# Patient Record
Sex: FEMALE | Race: WHITE | NOT HISPANIC OR LATINO | URBAN - METROPOLITAN AREA
[De-identification: names, ages, dates, MRNs, and addresses within clinical notes are randomized per-mention and may not be internally consistent; named-entity substitution may affect disease eponyms.]

---

## 2018-07-18 ENCOUNTER — APPOINTMENT (RX ONLY)
Dept: URBAN - METROPOLITAN AREA CLINIC 23 | Facility: CLINIC | Age: 63
Setting detail: DERMATOLOGY
End: 2018-07-18

## 2018-07-18 DIAGNOSIS — L82.1 OTHER SEBORRHEIC KERATOSIS: ICD-10-CM

## 2018-07-18 DIAGNOSIS — L91.8 OTHER HYPERTROPHIC DISORDERS OF THE SKIN: ICD-10-CM

## 2018-07-18 DIAGNOSIS — D18.0 HEMANGIOMA: ICD-10-CM

## 2018-07-18 DIAGNOSIS — L81.4 OTHER MELANIN HYPERPIGMENTATION: ICD-10-CM

## 2018-07-18 PROBLEM — J30.1 ALLERGIC RHINITIS DUE TO POLLEN: Status: ACTIVE | Noted: 2018-07-18

## 2018-07-18 PROBLEM — M12.9 ARTHROPATHY, UNSPECIFIED: Status: ACTIVE | Noted: 2018-07-18

## 2018-07-18 PROBLEM — D18.01 HEMANGIOMA OF SKIN AND SUBCUTANEOUS TISSUE: Status: ACTIVE | Noted: 2018-07-18

## 2018-07-18 PROCEDURE — 99214 OFFICE O/P EST MOD 30 MIN: CPT | Mod: 25

## 2018-07-18 PROCEDURE — 11200 RMVL SKIN TAGS UP TO&INC 15: CPT

## 2018-07-18 PROCEDURE — ? COUNSELING

## 2018-07-18 PROCEDURE — ? SKIN TAG REMOVAL

## 2018-07-18 ASSESSMENT — LOCATION SIMPLE DESCRIPTION DERM
LOCATION SIMPLE: UPPER BACK
LOCATION SIMPLE: ABDOMEN
LOCATION SIMPLE: LEFT UPPER ARM
LOCATION SIMPLE: RIGHT THIGH
LOCATION SIMPLE: LEFT THIGH
LOCATION SIMPLE: LEFT LOWER BACK
LOCATION SIMPLE: RIGHT UPPER BACK

## 2018-07-18 ASSESSMENT — LOCATION DETAILED DESCRIPTION DERM
LOCATION DETAILED: SUPERIOR THORACIC SPINE
LOCATION DETAILED: RIGHT INFERIOR UPPER BACK
LOCATION DETAILED: RIGHT LATERAL ABDOMEN
LOCATION DETAILED: LEFT SUPERIOR MEDIAL MIDBACK
LOCATION DETAILED: LEFT ANTERIOR MEDIAL PROXIMAL UPPER ARM
LOCATION DETAILED: EPIGASTRIC SKIN
LOCATION DETAILED: LEFT ANTERIOR PROXIMAL THIGH
LOCATION DETAILED: RIGHT ANTERIOR PROXIMAL THIGH

## 2018-07-18 ASSESSMENT — LOCATION ZONE DERM
LOCATION ZONE: TRUNK
LOCATION ZONE: ARM
LOCATION ZONE: LEG

## 2018-07-18 NOTE — PROCEDURE: SKIN TAG REMOVAL
Include Z78.9 (Other Specified Conditions Influencing Health Status) As An Associated Diagnosis?: No
Consent: Written consent obtained and the risks of skin tag removal was reviewed with the patient including but not limited to bleeding, pigmentary change, infection, pain, and remote possibility of scarring.
Medical Necessity Information: It is in your best interest to select a reason for this procedure from the list below. All of these items fulfill various CMS LCD requirements except the new and changing color options.
Medical Necessity Clause: This procedure was medically necessary because the lesion that was treated was
Detail Level: Detailed
Add Associated Diagnoses If Applicable When Selecting Medical Necessity: Yes
Hemostasis: Aluminum Chloride

## 2018-11-20 ENCOUNTER — HOSPITAL ENCOUNTER (OUTPATIENT)
Dept: PHYSICAL THERAPY | Age: 63
Discharge: HOME | End: 2018-11-20
Payer: COMMERCIAL

## 2018-11-27 ENCOUNTER — HOSPITAL ENCOUNTER (OUTPATIENT)
Dept: PHYSICAL THERAPY | Age: 63
Discharge: HOME | End: 2018-11-27
Payer: COMMERCIAL

## 2018-11-27 PROCEDURE — 97014 ELECTRIC STIMULATION THERAPY: CPT | Performed by: PHYSICAL THERAPIST

## 2018-11-27 PROCEDURE — 97140 MANUAL THERAPY 1/> REGIONS: CPT | Performed by: PHYSICAL THERAPIST

## 2018-11-27 PROCEDURE — 97162 PT EVAL MOD COMPLEX 30 MIN: CPT | Performed by: PHYSICAL THERAPIST

## 2018-11-27 PROCEDURE — 97110 THERAPEUTIC EXERCISES: CPT | Performed by: PHYSICAL THERAPIST

## 2018-11-27 NOTE — THERAPY EVALUATION
Helga Zhu  : [id number] @ [address]  [address] [place] [address].  [phone]   [phone]        OUTPATIENT PHYSICAL THERAPY:Initial Assessment 2018        ICD-10: Treatment Diagnosis: Pain in thoracic spine (M54.6)  Benign paroxysmal vertigo, left ear (H81.12)  Precautions/Allergies:   Naprosyn [naproxen] and Sulfa (sulfonamide antibiotics)   Fall Risk Score:    Ambulatory/Rehab Services H2 Model Falls Risk Assessment    Risk Factors:       (1)  Dizziness/Vertigo       (1)  Visual Impairment [specify:  has bifocals but not wearing at time of exam] Ability to Rise from Chair:       (1)  Pushes up, successful in one attempt    Falls Prevention Plan:       No modifications necessary   Total: (5 or greater = High Risk): 3     [facility]. All Rights Reserved. [facility] [employer] Patent #[id number]. Federal Law prohibits the replication, distribution or use without written permission from [facility] [employer]     MD Orders: PT Evaluate and Treat chronic mid-line thoracic back pain   Vestibular therapy for BPPV MEDICAL/REFERRING DIAGNOSIS:  Pain in thoracic spine [M54.6]   DATE OF ONSET: chronic over the past 1-2 years for thoracic pain  Vertigo 2018  REFERRING PHYSICIAN: Angelo Nolasco MD  RETURN PHYSICIAN APPOINTMENT: 2019     INITIAL ASSESSMENT:  Ms. Jacquelynne Closs presents with chief c/o mid thoracic pain that increases as the day progresses with activity. Pt has poor posture and moderate scoliosis. Pt has difficulty with IADLs such as laundry, cooking, and other tasks such as washing dishes. Pt will benefit from postural/core strengthening to increase tolerance for lifting and activity.  Pt has also had brief intermittent episodes of vertigo and felt mild symptoms with L Jeffrey Postin; however, symptoms were brief and no nystagmus noted so unsure if truly BPPV and plan to further assess as pt is treated for her thoracic pain. Pt will benefit from vestibular rehab to decrease vertigo and decrease chance for falls. PROBLEM LIST (Impacting functional limitations):  1. Decreased ADL/Functional Activities  2. Decreased Ambulation Ability/Technique  3. Decreased Balance  4. Increased Pain  5. Decreased Activity Tolerance  6. Decreased Work Simplification/Energy Conservation Techniques  7. Decreased Flexibility/Joint Mobility  8. Decreased [place] with Home Exercise Program INTERVENTIONS PLANNED:  1. Balance Exercise  2. Cold  3. Electrical Stimulation  4. Heat  5. Home Exercise Program (HEP)  6. Manual Therapy  7. Range of Motion (ROM)  8. Therapeutic Activites  9. Therapeutic Exercise/Strengthening  10. Ultrasound (US)  11. aquatics    TREATMENT PLAN:  Effective Dates: 11/27/2018 TO 2/25/2019 (90 days). Frequency/Duration: 2-3 times a week for 90 Days    GOALS: (Goals have been discussed and agreed upon with patient.)  Short-Term Functional Goals: Time Frame: 4 weeks  1. Pt will be able to report compliance with HEP. 2. Pt will report at least 25% less symptoms with her back during standing kitchen activities such as washing dishes and cooking. 3. Pt will be able to tolerate beginning core strengthening for at least 45 minutes in aquatic environment without increased symptoms to increase activity tolerance. Discharge Goals: Time Frame: 8 weeks  1. Pt will be able to single leg stance >10 seconds noting improving balance. 2. Pt will be able to tolerate cooking/cleaning her kitchen for at least 1 hour with minimal (<2/10) to no c/o.   3. Pt will be able to report no episodes of vertigo with all ADLs for at least 2-3 weeks in a row. 4. Pt will decrease Oswestry score 10/50 or less noting improving functional status. Rehabilitation Potential For Stated Goals: Excellent  Regarding Helga Zhu's therapy, I certify that the treatment plan above will be carried out by a therapist or under their direction.   Thank you for this referral,  Janet Rosario, PT       Referring Physician Signature: Maureen Box MD              Date                      HISTORY:   History of Present Injury/Illness (Reason for Referral):  Pt reports over the past 1-2 years her mid-back has been progressively worsened. Pt reports awaking in the morning is minimal to no symptoms; however, with just light household tasks pain begins to increase. Standing at the counter working in the kitchen or loading dishes into the  causes pain to escalate. As the day progresses symptoms can increase up to 10/10 if she does not sit and rest. Sitting relieves some of the pain. Pt also reports having very brief episodes of dizziness (reports spinning sensation) since March 2018. She saw an ENT and she has some mild hearing loss. He diagnosed her with BPPV (and possibly vertiginous migraine but was unlikely due to the short spells. She reports about 4 episodes over the past month--Once, coming out of [employer], once walking out of [facility], one episode sitting in her den, one episode while driving. Symptoms last only a couple of seconds but she is fearful she could fall. She has not had any falls. Pt does reports in the past having migraine headaches but is taking topiramate and symptoms have mostly resolved. She has occasional tension headaches but denies headaches during vertigo episodes and denies cervical pain.    Past Medical History/Comorbidities:   Ms. Shirley Tolentino  has a past medical history of Backache, unspecified, Bleeding hemorrhoid, Breast cancer ([address] Degeneration of thoracic or thoracolumbar intervertebral disc, Disturbance of skin sensation, Gastrointestinal disorder, GERD (gastroesophageal reflux disease), Herpes simplex, Herpes simplex without mention of complication, Hyperlipidemia, Impaired fasting glucose, Intercostal neuritis, Malignant neoplasm of breast (female), unspecified site, Migraine, Migraine with aura, without mention of intractable migraine without mention of status migrainosus, Mixed hyperlipidemia, 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 Ms. Isac Liu  has a past surgical history that includes hx cholecystectomy (2004); hx hysterectomy; hx orthopaedic; hx tonsillectomy; pr breast surgery procedure unlisted (06/12/2014); and hx colonoscopy (08/22/2016). 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 , Disp: 30 g, Rfl: 12    DULoxetine (CYMBALTA) 60 mg capsule, Take 1 Cap by mouth daily. , Disp: 90 Cap, Rfl: 4    topiramate (TOPAMAX) 25 mg tablet, Take 1 Tab by mouth daily (with breakfast). , Disp: 90 Tab, Rfl: 4    pantoprazole (PROTONIX) 40 mg tablet, Take 1 Tab by mouth daily. , Disp: 90 Tab, Rfl: 4    SUMAtriptan (IMITREX) 100 mg tablet, Take 1 Tab by mouth once as needed for Migraine. , Disp: 9 Tab, Rfl: 12    atorvastatin (LIPITOR) 10 mg tablet, Take 1 Tab by mouth daily. , Disp: 90 Tab, Rfl: 4    tiZANidine (ZANAFLEX) 4 mg tablet, Take 1 Tab by mouth two (2) times daily as needed. , Disp: 60 Tab, Rfl: 6    magnesium 250 mg tab, Take 1 Tab by mouth daily. , Disp: , Rfl:     garlic 2,630 mg cap, Take 1 Cap by mouth daily. , Disp: , Rfl:     Biotin 5 mg tab, Take 1 Tab by mouth daily. , Disp: , Rfl:     multivitamin (ONE A DAY) tablet, Take 1 Tab by mouth daily. , Disp: , Rfl:     GLUCOSAMINE HCL/CHONDR SALAS A NA (GLUCOSAMINE-CHONDROITIN) 750-600 mg tab, Take 2 Tabs by mouth daily. , Disp: , Rfl:     cholecalciferol, vitamin D3, (VITAMIN D3) 2,000 unit tab, Take 2 Tabs by mouth daily. , Disp: , Rfl:     cetirizine (ZYRTEC) 10 mg tablet, Take 10 mg by mouth daily. , Disp: , Rfl:    Date Last Reviewed:  11/27/2018   # of Personal Factors/Comorbidities that affect the Plan of Care: 3+: HIGH COMPLEXITY   EXAMINATION:   Observation/Orthostatic Postural Assessment:          Moderate scoliotic S curve through lower thoracic area; mod rounded shoulders with FHP with increased cervicothoracic hump  Palpation:          Tender R>L mid thoracic paraspinals with tightness and trigger points noted R thoracic paraspinals  ROM:  UEs and LEs WFL  Cervical AROM WFL  Trunk Flexion to ankles but pain in thoracic area upon return to standing  Trunk extension WFL  Sidebending WFL  Rotation to R 25% limited and painful   to L mild \"twinge\" but ROM WFL                  Strength:  UEs grossly 4+/5 except B wrist extension 4/5; BLE hip flexion 4+/5; knees 4+/5; ankle DF 4+/5  Special Tests: Smooth Pursuits WNL; Saccades WNL; Head Thrust (-); Head Shaking (-); PG&E [place] (L had brief dizziness lasting less than 5 seconds and no nystagmus noted); [facility] R (-)  Neurological Screen:        Sensation: denies numbness/tingling and intact   Functional Mobility:         Gait/Ambulation:  Independent; reports if has an episode of vertigo she if fearful of falling but not today        Transfers:  Independent with min use of UEs        Bed Mobility:  Independent but mild increased time to get into prone  Balance:          Single leg stance <3 seconds bilaterally   Body Structures Involved:  1. Eyes and Ears  2. Joints  3. Muscles Body Functions Affected:  1. Sensory/Pain  2. Neuromusculoskeletal  3. Movement Related  4. Vestibular Activities and Participation Affected:  1.  General Tasks and Demands  2. Mobility  3. Domestic Life  4. Community, Social and [place]   # of elements that affect the Plan of Care: 4+: HIGH COMPLEXITY   CLINICAL PRESENTATION:   Presentation: Evolving clinical presentation with unstable and unpredictable characteristics: HIGH COMPLEXITY   CLINICAL DECISION MAKING:   Outcome Measure: Tool Used: Modified Oswestry Low Back Pain Questionnaire  Score:  Initial: 17/50  Most Recent: X/50 (Date: -- )   Interpretation of Score: Each section is scored on a 0-5 scale, 5 representing the greatest disability. The scores of each section are added together for a total score of 50. Medical Necessity:   · Patient is expected to demonstrate progress in strength, range of motion, balance and functional technique to improve safety during gait and improve tolerance for activity/IADLs. Reason for Services/Other Comments:  · Patient continues to require modification of therapeutic interventions to increase complexity of exercises. Use of outcome tool(s) and clinical judgement create a POC that gives a: Questionable prediction of patient's progress: MODERATE COMPLEXITY   TREATMENT:   (In addition to Assessment/Re-Assessment sessions the following treatments were rendered)    Therapeutic Exercise: (see flow sheet below for minutes):  Exercises per grid below to improve mobility and strength. Required minimal visual, verbal and tactile cues to promote proper body alignment and promote proper body posture. Aquatic Therapy (see flow sheet below for minutes): Aquatic treatment performed per flow grid for Decreased muscle strength, Decreased static/dynamic balance and reactive control, Decreased range of motion, Decreased activity endurance and Decompression. Cues provided for technique.       Date: 11/27/18       Modalities: 15 minutes       Pre-mod B mid thoracic paraspinals  One channel R/one channel L; pt in prone with pillow under abdomen with ice over thoracic region Manual Therapy: 20 minutes       Soft tissue mobilization (STM) thoracic paraspinals; mid trap/rhomoboids/lower trap Pt in prone with pillow under abdomen       Epley Maneuver  To th L               Aquatic Exercise: next       Gait F/B/S/M        Heel/Toe walk        4-way        PF/DF        Hamstring Curls        Hip Flexion with knee ext. (rockette)        Squats          SLR march        Lunges        Hip circles        Hip horizontal abduction/adduction        Core:          Core:        Deep well bike        Deep well jumping geri        Deep well scissors        Deep well:  traction                Hamstring Stretch        Step Lunge        Piriformis stretch        PF Stretch        Balance: Single leg Stance        Balance: Tandem                          Therapeutic Exercise: 15 minutes       Mid thoracic stretch 3 H 15       Doorway pec stretch 3 H 15       scapt-retract x10       Transverse abdominous stabilization Pt in supine   10 H 5-10       bridging x10               F=forward  B=Backward  S=sidesteps  M=marches    H=hold    Manual: (see flow sheet above for minutes) see above to decrease muscular tightness and release trigger points  Modalities: (see flow sheet above for minutes) see above to decrease pain and muscular spasm; pt monitored and skin WNL    HEP: Pt was instructed with above HEP during initial evaluation and issued handout. Treatment/Session Assessment:  Pt only felt brief dizziness with [facility]; however, symptoms were not convincing that it was BPPV. Plan to incorporate vestibular treatment for hypofunctioning vestibular symptom as well. Pt had a lessening of symptoms with STM to mid thoracic paraspinals and felt relief with e-stim and ice; however, after return to sitting, pt still felt symptoms in mid-thoracic area. Discussed using aquatics for the warmth of the water to begin to work on core strengthening and pt agreeable.      · Pain/ Symptoms: Initial: 3-10/10 mid thoracic constant ache that worsens with activity    Brief episodes of vertigo lasting a few seconds (2 episodes last week and about 4-5/month; however no c/o today) Post Session:  2/10 mid thoracic  ·   Compliance with Program/Exercises: Will assess as treatment progresses. · Recommendations/Intent for next treatment session: \"Next visit will focus on advancements to more challenging activities\". Plan to reassess Jennifer Christine next session and then proceed with aquatics for postural strengthening. Incorporate head movements into gait activities.    Total Treatment Duration:  PT Patient Time In/Time Out  Time In: 1100  Time Out: 1210     Janet Rosario, PT

## 2018-11-29 ENCOUNTER — HOSPITAL ENCOUNTER (OUTPATIENT)
Dept: PHYSICAL THERAPY | Age: 63
Discharge: HOME | End: 2018-11-29
Payer: COMMERCIAL

## 2018-11-29 PROCEDURE — 97113 AQUATIC THERAPY/EXERCISES: CPT

## 2018-11-29 PROCEDURE — 97014 ELECTRIC STIMULATION THERAPY: CPT

## 2018-11-29 NOTE — PROGRESS NOTES
Helga Zhu  : [id number] @ [address], [address] [place] [address]  [phone]   [phone]        OUTPATIENT PHYSICAL THERAPY:Daily Note 2018        ICD-10: Treatment Diagnosis: Pain in thoracic spine (M54.6)  Benign paroxysmal vertigo, left ear (H81.12)  Precautions/Allergies:   Naprosyn [naproxen] and Sulfa (sulfonamide antibiotics)   Fall Risk Score:    Ambulatory/Rehab Services H2 Model Falls Risk Assessment    Risk Factors:       (1)  Dizziness/Vertigo       (1)  Visual Impairment [specify:  has bifocals but not wearing at time of exam] Ability to Rise from Chair:       (1)  Pushes up, successful in one attempt    Falls Prevention Plan:       No modifications necessary   Total: (5 or greater = High Risk): 3     [facility] [place]. All Rights Reserved. [facility] [employer] Patent #[id number]. Federal Law prohibits the replication, distribution or use without written permission from [employer]     MD Orders: PT Evaluate and Treat chronic mid-line thoracic back pain   Vestibular therapy for BPPV MEDICAL/REFERRING DIAGNOSIS:  Pain in thoracic spine [M54.6]   DATE OF ONSET: chronic over the past 1-2 years for thoracic pain  Vertigo 2018  REFERRING PHYSICIAN: Billy Helton MD  RETURN PHYSICIAN APPOINTMENT: 2019     INITIAL ASSESSMENT:  Ms. Palmer Case presents with chief c/o mid thoracic pain that increases as the day progresses with activity. Pt has poor posture and moderate scoliosis. Pt has difficulty with IADLs such as laundry, cooking, and other tasks such as washing dishes. Pt will benefit from postural/core strengthening to increase tolerance for lifting and activity.  Pt has also had brief intermittent episodes of vertigo and felt mild symptoms with MARIANO Wood; however, symptoms were brief and no nystagmus noted so unsure if truly BPPV and plan to further assess as pt is treated for her thoracic pain. Pt will benefit from vestibular rehab to decrease vertigo and decrease chance for falls. PROBLEM LIST (Impacting functional limitations):  1. Decreased ADL/Functional Activities  2. Decreased Ambulation Ability/Technique  3. Decreased Balance  4. Increased Pain  5. Decreased Activity Tolerance  6. Decreased Work Simplification/Energy Conservation Techniques  7. Decreased Flexibility/Joint Mobility  8. Decreased [place] with Home Exercise Program INTERVENTIONS PLANNED:  1. Balance Exercise  2. Cold  3. Electrical Stimulation  4. Heat  5. Home Exercise Program (HEP)  6. Manual Therapy  7. Range of Motion (ROM)  8. Therapeutic Activites  9. Therapeutic Exercise/Strengthening  10. Ultrasound (US)  11. aquatics    TREATMENT PLAN:  Effective Dates: 11/27/2018 TO 2/25/2019 (90 days). Frequency/Duration: 2-3 times a week for 90 Days    GOALS: (Goals have been discussed and agreed upon with patient.)  Short-Term Functional Goals: Time Frame: 4 weeks  1. Pt will be able to report compliance with HEP. 2. Pt will report at least 25% less symptoms with her back during standing kitchen activities such as washing dishes and cooking. 3. Pt will be able to tolerate beginning core strengthening for at least 45 minutes in aquatic environment without increased symptoms to increase activity tolerance. Discharge Goals: Time Frame: 8 weeks  1. Pt will be able to single leg stance >10 seconds noting improving balance. 2. Pt will be able to tolerate cooking/cleaning her kitchen for at least 1 hour with minimal (<2/10) to no c/o.   3. Pt will be able to report no episodes of vertigo with all ADLs for at least 2-3 weeks in a row. 4. Pt will decrease Oswestry score 10/50 or less noting improving functional status. Rehabilitation Potential For Stated Goals: Excellent  Regarding Helga Zhu's therapy, I certify that the treatment plan above will be carried out by a therapist or under their direction.   Thank you for this referral,  Armida Zaidi, [facility]                   HISTORY:   History of Present Injury/Illness (Reason for Referral):  Pt reports over the past 1-2 years her mid-back has been progressively worsened. Pt reports awaking in the morning is minimal to no symptoms; however, with just light household tasks pain begins to increase. Standing at the counter working in the kitchen or loading dishes into the  causes pain to escalate. As the day progresses symptoms can increase up to 10/10 if she does not sit and rest. Sitting relieves some of the pain. Pt also reports having very brief episodes of dizziness (reports spinning sensation) since March 2018. She saw an ENT and she has some mild hearing loss. He diagnosed her with BPPV (and possibly vertiginous migraine but was unlikely due to the short spells. She reports about 4 episodes over the past month--Once, coming out of [employer], once walking out of [facility], one episode sitting in her den, one episode while driving. Symptoms last only a couple of seconds but she is fearful she could fall. She has not had any falls. Pt does reports in the past having migraine headaches but is taking topiramate and symptoms have mostly resolved. She has occasional tension headaches but denies headaches during vertigo episodes and denies cervical pain.    Past Medical History/Comorbidities:   Ms. 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 Ms. Ludwin Montalvo  has a past surgical history that includes hx cholecystectomy (2004); hx hysterectomy; hx orthopaedic; hx tonsillectomy; pr breast surgery procedure unlisted (06/12/2014); and hx colonoscopy (08/22/2016). 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 , Disp: 30 g, Rfl: 12    DULoxetine (CYMBALTA) 60 mg capsule, Take 1 Cap by mouth daily. , Disp: 90 Cap, Rfl: 4    topiramate (TOPAMAX) 25 mg tablet, Take 1 Tab by mouth daily (with breakfast). , Disp: 90 Tab, Rfl: 4    pantoprazole (PROTONIX) 40 mg tablet, Take 1 Tab by mouth daily. , Disp: 90 Tab, Rfl: 4    SUMAtriptan (IMITREX) 100 mg tablet, Take 1 Tab by mouth once as needed for Migraine. , Disp: 9 Tab, Rfl: 12    atorvastatin (LIPITOR) 10 mg tablet, Take 1 Tab by mouth daily. , Disp: 90 Tab, Rfl: 4    tiZANidine (ZANAFLEX) 4 mg tablet, Take 1 Tab by mouth two (2) times daily as needed. , Disp: 60 Tab, Rfl: 6    magnesium 250 mg tab, Take 1 Tab by mouth daily. , Disp: , Rfl:     garlic 1,122 mg cap, Take 1 Cap by mouth daily. , Disp: , Rfl:     Biotin 5 mg tab, Take 1 Tab by mouth daily. , Disp: , Rfl:     multivitamin (ONE A DAY) tablet, Take 1 Tab by mouth daily. , Disp: , Rfl:     GLUCOSAMINE HCL/CHONDR MARITZA LYMAN (GLUCOSAMINE-CHONDROITIN) 750-600 mg tab, Take 2 Tabs by mouth daily. , Disp: , Rfl:     cholecalciferol, vitamin D3, (VITAMIN D3) 2,000 unit tab, Take 2 Tabs by mouth daily. , Disp: , Rfl:     cetirizine (ZYRTEC) 10 mg tablet, Take 10 mg by mouth daily. , Disp: , Rfl:    Date Last Reviewed:  11/29/2018   EXAMINATION:   Observation/Orthostatic Postural Assessment:          Moderate scoliotic S curve through lower thoracic area; mod rounded shoulders with FHP with increased cervicothoracic hump  Palpation:          Tender R>L mid thoracic paraspinals with tightness and trigger points noted R thoracic paraspinals  ROM:  UEs and LEs WFL  Cervical AROM WFL  Trunk Flexion to ankles but pain in thoracic area upon return to standing  Trunk extension WFL  Sidebending WFL  Rotation to R 25% limited and painful   to L mild \"twinge\" but ROM WFL                  Strength:  UEs grossly 4+/5 except B wrist extension 4/5; BLE hip flexion 4+/5; knees 4+/5; ankle DF 4+/5  Special Tests: Smooth Pursuits WNL; Saccades WNL; Head Thrust (-); Head Shaking (-); [employer] (L had brief dizziness lasting less than 5 seconds and no nystagmus noted); [facility] R (-)  Neurological Screen:        Sensation: denies numbness/tingling and intact   Functional Mobility:         Gait/Ambulation:  Independent; reports if has an episode of vertigo she if fearful of falling but not today        Transfers:  Independent with min use of UEs        Bed Mobility:  Independent but mild increased time to get into prone  Balance:          Single leg stance <3 seconds bilaterally   Body Structures Involved:  1. Eyes and Ears  2. Joints  3. Muscles Body Functions Affected:  1. Sensory/Pain  2. Neuromusculoskeletal  3. Movement Related  4. Vestibular Activities and Participation Affected:  1. General Tasks and Demands  2. Mobility  3. Domestic Life  4.  Community, Social and Civic Life   CLINICAL PRESENTATION:   CLINICAL DECISION MAKING:   Outcome Measure: Tool Used: Modified Oswestry Low Back Pain Questionnaire  Score:  Initial: 17/50  Most Recent: X/50 (Date: -- )   Interpretation of Score: Each section is scored on a 0-5 scale, 5 representing the greatest disability. The scores of each section are added together for a total score of 50. Medical Necessity:   · Patient is expected to demonstrate progress in strength, range of motion, balance and functional technique to improve safety during gait and improve tolerance for activity/IADLs. Reason for Services/Other Comments:  · Patient continues to require modification of therapeutic interventions to increase complexity of exercises. TREATMENT:   (In addition to Assessment/Re-Assessment sessions the following treatments were rendered)    Therapeutic Exercise: (see flow sheet below for minutes):  Exercises per grid below to improve mobility and strength. Required minimal visual, verbal and tactile cues to promote proper body alignment and promote proper body posture. Aquatic Therapy (see flow sheet below for minutes): Aquatic treatment performed per flow grid for Decreased muscle strength, Decreased static/dynamic balance and reactive control, Decreased range of motion, Decreased activity endurance and Decompression. Cues provided for technique. Date: 11/27/18 11/29/18  Visit 2      Modalities: 15 minutes 15 min      Pre-mod B mid thoracic paraspinals  One channel R/one channel L; pt in prone with pillow under abdomen with ice over thoracic region IFC with Ice to Thoracic in prone with pillow under abd. Manual Therapy: 20 minutes       Soft tissue mobilization (STM) thoracic paraspinals; mid trap/rhomoboids/lower trap Pt in prone with pillow under abdomen       Epley Maneuver  To th L               Aquatic Exercise: next 60 min  1.5#      Gait F/B/S/M  x4L      Heel/Toe walk        4-way        PF/DF        Hamstring Curls        Hip Flexion with knee ext.   (rockeileen) Squats          SLR march        Lunges        Hip circles        Hip horizontal abduction/adduction        Vestibular Re-Ed: eyes fixed  x4L      Vestibular re-ed: eyes following shoulder movement  x4L      Squats with UE movmemnt  x25      Saddle seating with 2 noodles: UE movement  x4L      Shoulder glides keeping head and hips still  x20      Pelvic tilts on noodle  x20      Core: tray walking push/pull with eyes fixed on target    x4L      Core:        Deep well bike  5 min      Deep well jumping geri  2 min      Deep well scissors  2 min      Deep well:  traction  6 min              Hamstring Stretch        Step Lunge        Piriformis stretch        PF Stretch        Balance: Single leg Stance        Balance: Tandem                          Therapeutic Exercise: 15 minutes       Mid thoracic stretch 3 H 15       Doorway pec stretch 3 H 15       scapt-retract x10       Transverse abdominous stabilization Pt in supine   10 H 5-10       bridging x10               F=forward  B=Backward  S=sidesteps  M=marches    H=hold    Manual: (see flow sheet above for minutes) see above to decrease muscular tightness and release trigger points  Modalities: (see flow sheet above for minutes) see above to decrease pain and muscular spasm; pt monitored and skin WNL    HEP: Pt was instructed with above HEP during initial evaluation and issued handout. Treatment/Session Assessment:  Pt performed aquatic therapy with 1.5# on BLE today to decrease pain and increase ROM. Pt did well in aquatic setting with noted decrease in pain. Performed IFC with ice therapy post aquatic session to reduce pain further. Pt reports being sore after last session and feeling better post session. · Pain/ Symptoms: Initial:   2-3/10 mid thoracic constant ache that worsens with activity    I'm sore after the last session like Janet told me I would be Post Session:  2/10 mid thoracic  ·   Compliance with Program/Exercises:  Will assess as treatment progresses. Recommendations/Intent for next treatment session: \"Next visit will focus on advancements to more challenging activities\".      Total Treatment Duration:  PT Patient Time In/Time Out  Time In: 1100  Time Out: [address], PTA

## 2018-12-04 ENCOUNTER — HOSPITAL ENCOUNTER (OUTPATIENT)
Dept: PHYSICAL THERAPY | Age: 63
Discharge: HOME | End: 2018-12-04
Payer: COMMERCIAL

## 2018-12-04 PROCEDURE — 97140 MANUAL THERAPY 1/> REGIONS: CPT | Performed by: PHYSICAL THERAPIST

## 2018-12-04 PROCEDURE — 97113 AQUATIC THERAPY/EXERCISES: CPT | Performed by: PHYSICAL THERAPIST

## 2018-12-04 NOTE — PROGRESS NOTES
Helga Zhu  : [id number] @ [address], [address] [place] [address]  [phone]   [phone]        OUTPATIENT PHYSICAL THERAPY:Daily Note 2018        ICD-10: Treatment Diagnosis: Pain in thoracic spine (M54.6)  Benign paroxysmal vertigo, left ear (H81.12)  Precautions/Allergies:   Naprosyn [naproxen] and Sulfa (sulfonamide antibiotics)   Fall Risk Score:    Ambulatory/Rehab Services H2 Model Falls Risk Assessment    Risk Factors:       (1)  Dizziness/Vertigo       (1)  Visual Impairment [specify:  has bifocals but not wearing at time of exam] Ability to Rise from Chair:       (1)  Pushes up, successful in one attempt    Falls Prevention Plan:       No modifications necessary   Total: (5 or greater = High Risk): 3     [facility] of [employer]. All Rights Reserved. [facility] [employer] Patent #[id number]. Federal Law prohibits the replication, distribution or use without written permission from [employer]     MD Orders: PT Evaluate and Treat chronic mid-line thoracic back pain   Vestibular therapy for BPPV MEDICAL/REFERRING DIAGNOSIS:  Pain in thoracic spine [M54.6]   DATE OF ONSET: chronic over the past 1-2 years for thoracic pain  Vertigo 2018  REFERRING PHYSICIAN: Charity Hernández MD  RETURN PHYSICIAN APPOINTMENT: 2019     INITIAL ASSESSMENT:  Ms. Shantel Mcintyre presents with chief c/o mid thoracic pain that increases as the day progresses with activity. Pt has poor posture and moderate scoliosis. Pt has difficulty with IADLs such as laundry, cooking, and other tasks such as washing dishes. Pt will benefit from postural/core strengthening to increase tolerance for lifting and activity.  Pt has also had brief intermittent episodes of vertigo and felt mild symptoms with MARIANO Dubose; however, symptoms were brief and no nystagmus noted so unsure if truly BPPV and plan to further assess as pt is treated for her thoracic pain. Pt will benefit from vestibular rehab to decrease vertigo and decrease chance for falls. PROBLEM LIST (Impacting functional limitations):  1. Decreased ADL/Functional Activities  2. Decreased Ambulation Ability/Technique  3. Decreased Balance  4. Increased Pain  5. Decreased Activity Tolerance  6. Decreased Work Simplification/Energy Conservation Techniques  7. Decreased Flexibility/Joint Mobility  8. Decreased Lamar with Home Exercise Program INTERVENTIONS PLANNED:  1. Balance Exercise  2. Cold  3. Electrical Stimulation  4. Heat  5. Home Exercise Program (HEP)  6. Manual Therapy  7. Range of Motion (ROM)  8. Therapeutic Activites  9. Therapeutic Exercise/Strengthening  10. Ultrasound (US)  11. aquatics    TREATMENT PLAN:  Effective Dates: 11/27/2018 TO 2/25/2019 (90 days). Frequency/Duration: 2-3 times a week for 90 Days    GOALS: (Goals have been discussed and agreed upon with patient.)  Short-Term Functional Goals: Time Frame: 4 weeks  1. Pt will be able to report compliance with HEP. 2. Pt will report at least 25% less symptoms with her back during standing kitchen activities such as washing dishes and cooking. 3. Pt will be able to tolerate beginning core strengthening for at least 45 minutes in aquatic environment without increased symptoms to increase activity tolerance. Discharge Goals: Time Frame: 8 weeks  1. Pt will be able to single leg stance >10 seconds noting improving balance. 2. Pt will be able to tolerate cooking/cleaning her kitchen for at least 1 hour with minimal (<2/10) to no c/o.   3. Pt will be able to report no episodes of vertigo with all ADLs for at least 2-3 weeks in a row. 4. Pt will decrease Oswestry score 10/50 or less noting improving functional status. Rehabilitation Potential For Stated Goals: Excellent  Regarding Helga Zhu's therapy, I certify that the treatment plan above will be carried out by a therapist or under their direction.   Thank you for this referral,  Janet Rosario, PT                   HISTORY:   History of Present Injury/Illness (Reason for Referral):  Pt reports over the past 1-2 years her mid-back has been progressively worsened. Pt reports awaking in the morning is minimal to no symptoms; however, with just light household tasks pain begins to increase. Standing at the counter working in the kitchen or loading dishes into the  causes pain to escalate. As the day progresses symptoms can increase up to 10/10 if she does not sit and rest. Sitting relieves some of the pain. Pt also reports having very brief episodes of dizziness (reports spinning sensation) since March 2018. She saw an ENT and she has some mild hearing loss. He diagnosed her with BPPV (and possibly vertiginous migraine but was unlikely due to the short spells. She reports about 4 episodes over the past month--Once, coming out of [employer], once walking out of [facility], one episode sitting in her den, one episode while driving. Symptoms last only a couple of seconds but she is fearful she could fall. She has not had any falls. Pt does reports in the past having migraine headaches but is taking topiramate and symptoms have mostly resolved. She has occasional tension headaches but denies headaches during vertigo episodes and denies cervical pain.    Past Medical History/Comorbidities:   Ms. 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 Ms. Barrington Canavan  has a past surgical history that includes hx cholecystectomy (2004); hx hysterectomy; hx orthopaedic; hx tonsillectomy; pr breast surgery procedure unlisted (06/12/2014); and hx colonoscopy (08/22/2016). 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 , Disp: 30 g, Rfl: 12    DULoxetine (CYMBALTA) 60 mg capsule, Take 1 Cap by mouth daily. , Disp: 90 Cap, Rfl: 4    topiramate (TOPAMAX) 25 mg tablet, Take 1 Tab by mouth daily (with breakfast). , Disp: 90 Tab, Rfl: 4    pantoprazole (PROTONIX) 40 mg tablet, Take 1 Tab by mouth daily. , Disp: 90 Tab, Rfl: 4    SUMAtriptan (IMITREX) 100 mg tablet, Take 1 Tab by mouth once as needed for Migraine. , Disp: 9 Tab, Rfl: 12    atorvastatin (LIPITOR) 10 mg tablet, Take 1 Tab by mouth daily. , Disp: 90 Tab, Rfl: 4    tiZANidine (ZANAFLEX) 4 mg tablet, Take 1 Tab by mouth two (2) times daily as needed. , Disp: 60 Tab, Rfl: 6    magnesium 250 mg tab, Take 1 Tab by mouth daily. , Disp: , Rfl:     garlic 2,395 mg cap, Take 1 Cap by mouth daily. , Disp: , Rfl:     Biotin 5 mg tab, Take 1 Tab by mouth daily. , Disp: , Rfl:     multivitamin (ONE A DAY) tablet, Take 1 Tab by mouth daily. , Disp: , Rfl:     GLUCOSAMINE HCL/CHONDR MARITZA LYMAN (GLUCOSAMINE-CHONDROITIN) 750-600 mg tab, Take 2 Tabs by mouth daily. , Disp: , Rfl:     cholecalciferol, vitamin D3, (VITAMIN D3) 2,000 unit tab, Take 2 Tabs by mouth daily. , Disp: , Rfl:     cetirizine (ZYRTEC) 10 mg tablet, Take 10 mg by mouth daily. , Disp: , Rfl:    Date Last Reviewed:  12/4/2018   EXAMINATION:   Observation/Orthostatic Postural Assessment:          Moderate scoliotic S curve through lower thoracic area; mod rounded shoulders with FHP with increased cervicothoracic hump  Palpation:          Tender R>L mid thoracic paraspinals with tightness and trigger points noted R thoracic paraspinals  ROM:  UEs and LEs WFL  Cervical AROM WFL  Trunk Flexion to ankles but pain in thoracic area upon return to standing  Trunk extension WFL  Sidebending WFL  Rotation to R 25% limited and painful   to L mild \"twinge\" but ROM WFL                  Strength:  UEs grossly 4+/5 except B wrist extension 4/5; BLE hip flexion 4+/5; knees 4+/5; ankle DF 4+/5  Special Tests: Smooth Pursuits WNL; Saccades WNL; Head Thrust (-); Head Shaking (-); [employer] (L had brief dizziness lasting less than 5 seconds and no nystagmus noted); [facility] R (-)  Neurological Screen:        Sensation: denies numbness/tingling and intact   Functional Mobility:         Gait/Ambulation:  Independent; reports if has an episode of vertigo she if fearful of falling but not today        Transfers:  Independent with min use of UEs        Bed Mobility:  Independent but mild increased time to get into prone  Balance:          Single leg stance <3 seconds bilaterally   Body Structures Involved:  1. Eyes and Ears  2. Joints  3. Muscles Body Functions Affected:  1. Sensory/Pain  2. Neuromusculoskeletal  3. Movement Related  4. Vestibular Activities and Participation Affected:  1. General Tasks and Demands  2. Mobility  3. Domestic Life  4.  Community, Social and Civic Life   CLINICAL PRESENTATION:   CLINICAL DECISION MAKING:   Outcome Measure: Tool Used: Modified Oswestry Low Back Pain Questionnaire  Score:  Initial: 17/50  Most Recent: X/50 (Date: -- )   Interpretation of Score: Each section is scored on a 0-5 scale, 5 representing the greatest disability. The scores of each section are added together for a total score of 50. Medical Necessity:   · Patient is expected to demonstrate progress in strength, range of motion, balance and functional technique to improve safety during gait and improve tolerance for activity/IADLs. Reason for Services/Other Comments:  · Patient continues to require modification of therapeutic interventions to increase complexity of exercises. TREATMENT:   (In addition to Assessment/Re-Assessment sessions the following treatments were rendered)    Therapeutic Exercise: (see flow sheet below for minutes):  Exercises per grid below to improve mobility and strength. Required minimal visual, verbal and tactile cues to promote proper body alignment and promote proper body posture. Aquatic Therapy (see flow sheet below for minutes): Aquatic treatment performed per flow grid for Decreased muscle strength, Decreased static/dynamic balance and reactive control, Decreased range of motion, Decreased activity endurance and Decompression. Cues provided for technique. Date: 11/27/18 11/29/18  Visit 2 12/4/18  Visit 3     Modalities: 15 minutes 15 min      Pre-mod B mid thoracic paraspinals  One channel R/one channel L; pt in prone with pillow under abdomen with ice over thoracic region IFC with Ice to Thoracic in prone with pillow under abd.  Pt declined as reports no c/o pain                     Manual Therapy: 20 minutes  10 minutes     Soft tissue mobilization (STM) thoracic paraspinals; mid trap/rhomoboids/lower trap Pt in prone with pillow under abdomen  Focus of R mid thoracic paraspinals and lower trap; pt in prone     Epley Maneuver  To th L               Aquatic Exercise: next 60 min  1.5# 60 min 1.5#     Gait F/B/S/M  x4L x4L with UE movements     Heel/Toe walk        4-way        PF/DF        Hamstring Curls        Hip Flexion with knee ext. (rockette)        Squats          SLR march        Lunges        Hip circles        Hip horizontal abduction/adduction        Vestibular Re-Ed: eyes fixed  x4L With Rotation R/L x2L  Then up/down with head  x2L     Vestibular re-ed: eyes following shoulder movement  x4L x4L      Squats with UE movmemnt  x25 x25 flex/ext shlds     Saddle seating with 2 noodles: UE movement  x4L x2L F  x2L B     Shoulder glides keeping head and hips still  x20 x20     Pelvic tilts on noodle  x20 x20     Core: tray walking push/pull with eyes fixed on target    x4L x4L     UE row while in mini-squat   Paddles open  x15     UE horizontal abd/add in mini-squat   Paddles opne  x15     Core:        Deep well bike  5 min 5 min     Deep well jumping geri  2 min 2 min     Deep well scissors  2 min 2 min     Deep well:  traction  6 min 5 min             Hamstring Stretch        Step Lunge        Piriformis stretch        PF Stretch        Balance: Single leg Stance        Balance: Tandem                          Therapeutic Exercise: 15 minutes       Mid thoracic stretch 3 H 15       Doorway pec stretch 3 H 15       scapt-retract x10       Transverse abdominous stabilization Pt in supine   10 H 5-10       bridging x10                       F=forward  B=Backward  S=sidesteps  M=marches    H=hold    Manual: (see flow sheet above for minutes) see above to decrease muscular tightness and release trigger points  Modalities: (see flow sheet above for minutes) see above to decrease pain and muscular spasm; pt monitored and skin WNL    HEP: Pt was instructed with above HEP during initial evaluation and issued handout. Treatment/Session Assessment:  Began session with STM to decrease tightness in mid-thoracica area.  Pt still has trigger points in R lower trap and R mid thoracic paraspinal area; however, it is less painful than last week and less palpable tightness. Proceeded with advancing aquatics for core/thoracic/UE strengthening per flow and pt tolerated well. No c/o afterwards and declined modalities. Pt instructed to use later to decrease post-soft tissue mobilization soreness if needed. · Pain/ Symptoms: Initial:   1-2/10 mid thoracic constant ache that worsens with activity    I've been feeling a little better. The exercises seem to be helping. I did have one time when I sitting in one place for a while looking at pictures and I really felt the pain in my mid-back, but other than that, I've felt better. I have a slight dull headache this morning. \"    No c/o vertigo Post Session:  0/10 mid thoracic   \"I feel great. I don't even have any headache right now. \" ·   Compliance with Program/Exercises: compliant  Recommendations/Intent for next treatment session: \"Next visit will focus on advancements to more challenging activities\". Continue using manual therapy as needed and advance aquatics next visit.     Total Treatment Duration:  PT Patient Time In/Time Out  Time In: 1055  Time Out: 1205     Janet Rosario, PT

## 2018-12-06 ENCOUNTER — HOSPITAL ENCOUNTER (OUTPATIENT)
Dept: PHYSICAL THERAPY | Age: 63
Discharge: HOME | End: 2018-12-06
Payer: COMMERCIAL

## 2018-12-06 PROCEDURE — 97113 AQUATIC THERAPY/EXERCISES: CPT | Performed by: PHYSICAL THERAPIST

## 2018-12-06 NOTE — PROGRESS NOTES
Helga Zhu  : 1955 [address] @ [address] [address].  [phone]   [phone]        OUTPATIENT PHYSICAL THERAPY:Daily Note 2018        ICD-10: Treatment Diagnosis: Pain in thoracic spine (M54.6)  Benign paroxysmal vertigo, left ear (H81.12)  Precautions/Allergies:   Naprosyn [naproxen] and Sulfa (sulfonamide antibiotics)   Fall Risk Score:    Ambulatory/Rehab Services H2 Model Falls Risk Assessment    Risk Factors:       (1)  Dizziness/Vertigo       (1)  Visual Impairment [specify:  has bifocals but not wearing at time of exam] Ability to Rise from Chair:       (1)  Pushes up, successful in one attempt    Falls Prevention Plan:       No modifications necessary   Total: (5 or greater = High Risk): 3     [facility] [employer]. All Rights Reserved. [facility] [employer] Patent #[id number]. Federal Law prohibits the replication, distribution or use without written permission from [employer]     MD Orders: PT Evaluate and Treat chronic mid-line thoracic back pain   Vestibular therapy for BPPV MEDICAL/REFERRING DIAGNOSIS:  Pain in thoracic spine [M54.6]   DATE OF ONSET: chronic over the past 1-2 years for thoracic pain  Vertigo 2018  REFERRING PHYSICIAN: Sumanth Lopez MD  RETURN PHYSICIAN APPOINTMENT: 2019     INITIAL ASSESSMENT:  Ms. Kelsi Adamson presents with chief c/o mid thoracic pain that increases as the day progresses with activity. Pt has poor posture and moderate scoliosis. Pt has difficulty with IADLs such as laundry, cooking, and other tasks such as washing dishes. Pt will benefit from postural/core strengthening to increase tolerance for lifting and activity.  Pt has also had brief intermittent episodes of vertigo and felt mild symptoms with [employer]; however, symptoms were brief and no nystagmus noted so unsure if truly BPPV and plan to further assess as pt is treated for her thoracic pain. Pt will benefit from vestibular rehab to decrease vertigo and decrease chance for falls. PROBLEM LIST (Impacting functional limitations):  1. Decreased ADL/Functional Activities  2. Decreased Ambulation Ability/Technique  3. Decreased Balance  4. Increased Pain  5. Decreased Activity Tolerance  6. Decreased Work Simplification/Energy Conservation Techniques  7. Decreased Flexibility/Joint Mobility  8. Decreased [place] with Home Exercise Program INTERVENTIONS PLANNED:  1. Balance Exercise  2. Cold  3. Electrical Stimulation  4. Heat  5. Home Exercise Program (HEP)  6. Manual Therapy  7. Range of Motion (ROM)  8. Therapeutic Activites  9. Therapeutic Exercise/Strengthening  10. Ultrasound (US)  11. aquatics    TREATMENT PLAN:  Effective Dates: 11/27/2018 TO 2/25/2019 (90 days). Frequency/Duration: 2-3 times a week for 90 Days    GOALS: (Goals have been discussed and agreed upon with patient.)  Short-Term Functional Goals: Time Frame: 4 weeks  1. Pt will be able to report compliance with HEP. 2. Pt will report at least 25% less symptoms with her back during standing kitchen activities such as washing dishes and cooking. 3. Pt will be able to tolerate beginning core strengthening for at least 45 minutes in aquatic environment without increased symptoms to increase activity tolerance. Discharge Goals: Time Frame: 8 weeks  1. Pt will be able to single leg stance >10 seconds noting improving balance. 2. Pt will be able to tolerate cooking/cleaning her kitchen for at least 1 hour with minimal (<2/10) to no c/o.   3. Pt will be able to report no episodes of vertigo with all ADLs for at least 2-3 weeks in a row. 4. Pt will decrease Oswestry score 10/50 or less noting improving functional status. Rehabilitation Potential For Stated Goals: Excellent  Regarding Helga Zhu's therapy, I certify that the treatment plan above will be carried out by a therapist or under their direction.   Thank you for this referral,  Janet Rosario, PT                   HISTORY:   History of Present Injury/Illness (Reason for Referral):  Pt reports over the past 1-2 years her mid-back has been progressively worsened. Pt reports awaking in the morning is minimal to no symptoms; however, with just light household tasks pain begins to increase. Standing at the counter working in the kitchen or loading dishes into the  causes pain to escalate. As the day progresses symptoms can increase up to 10/10 if she does not sit and rest. Sitting relieves some of the pain. Pt also reports having very brief episodes of dizziness (reports spinning sensation) since March 2018. She saw an ENT and she has some mild hearing loss. He diagnosed her with BPPV (and possibly vertiginous migraine but was unlikely due to the short spells. She reports about 4 episodes over the past month--Once, coming out of [employer], once walking out of [facility], one episode sitting in her den, one episode while driving. Symptoms last only a couple of seconds but she is fearful she could fall. She has not had any falls. Pt does reports in the past having migraine headaches but is taking topiramate and symptoms have mostly resolved. She has occasional tension headaches but denies headaches during vertigo episodes and denies cervical pain.    Past Medical History/Comorbidities:   Ms. 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 Ms. Anyi Underwood  has a past surgical history that includes hx cholecystectomy (2004); hx hysterectomy; hx orthopaedic; hx tonsillectomy; pr breast surgery procedure unlisted (06/12/2014); and hx colonoscopy (08/22/2016). 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 , Disp: 30 g, Rfl: 12    DULoxetine (CYMBALTA) 60 mg capsule, Take 1 Cap by mouth daily. , Disp: 90 Cap, Rfl: 4    topiramate (TOPAMAX) 25 mg tablet, Take 1 Tab by mouth daily (with breakfast). , Disp: 90 Tab, Rfl: 4    pantoprazole (PROTONIX) 40 mg tablet, Take 1 Tab by mouth daily. , Disp: 90 Tab, Rfl: 4    SUMAtriptan (IMITREX) 100 mg tablet, Take 1 Tab by mouth once as needed for Migraine. , Disp: 9 Tab, Rfl: 12    atorvastatin (LIPITOR) 10 mg tablet, Take 1 Tab by mouth daily. , Disp: 90 Tab, Rfl: 4    tiZANidine (ZANAFLEX) 4 mg tablet, Take 1 Tab by mouth two (2) times daily as needed. , Disp: 60 Tab, Rfl: 6    magnesium 250 mg tab, Take 1 Tab by mouth daily. , Disp: , Rfl:     garlic 0,123 mg cap, Take 1 Cap by mouth daily. , Disp: , Rfl:     Biotin 5 mg tab, Take 1 Tab by mouth daily. , Disp: , Rfl:     multivitamin (ONE A DAY) tablet, Take 1 Tab by mouth daily. , Disp: , Rfl:     GLUCOSAMINE HCL/CHONDR MARITZA LYMAN (GLUCOSAMINE-CHONDROITIN) 750-600 mg tab, Take 2 Tabs by mouth daily. , Disp: , Rfl:     cholecalciferol, vitamin D3, (VITAMIN D3) 2,000 unit tab, Take 2 Tabs by mouth daily. , Disp: , Rfl:     cetirizine (ZYRTEC) 10 mg tablet, Take 10 mg by mouth daily. , Disp: , Rfl:    Date Last Reviewed:  12/6/2018   EXAMINATION:   Observation/Orthostatic Postural Assessment:          Moderate scoliotic S curve through lower thoracic area; mod rounded shoulders with FHP with increased cervicothoracic hump  Palpation:          Tender R>L mid thoracic paraspinals with tightness and trigger points noted R thoracic paraspinals  ROM:  UEs and LEs WFL  Cervical AROM WFL  Trunk Flexion to ankles but pain in thoracic area upon return to standing  Trunk extension WFL  Sidebending WFL  Rotation to R 25% limited and painful   to L mild \"twinge\" but ROM WFL                  Strength:  UEs grossly 4+/5 except B wrist extension 4/5; BLE hip flexion 4+/5; knees 4+/5; ankle DF 4+/5  Special Tests: Smooth Pursuits WNL; Saccades WNL; Head Thrust (-); Head Shaking (-); [employer] (L had brief dizziness lasting less than 5 seconds and no nystagmus noted); [facility] R (-)  Neurological Screen:        Sensation: denies numbness/tingling and intact   Functional Mobility:         Gait/Ambulation:  Independent; reports if has an episode of vertigo she if fearful of falling but not today        Transfers:  Independent with min use of UEs        Bed Mobility:  Independent but mild increased time to get into prone  Balance:          Single leg stance <3 seconds bilaterally   Body Structures Involved:  1. Eyes and Ears  2. Joints  3. Muscles Body Functions Affected:  1. Sensory/Pain  2. Neuromusculoskeletal  3. Movement Related  4. Vestibular Activities and Participation Affected:  1. General Tasks and Demands  2. Mobility  3. Domestic Life  4.  Community, Social and Civic Life   CLINICAL PRESENTATION:   CLINICAL DECISION MAKING:   Outcome Measure: Tool Used: Modified Oswestry Low Back Pain Questionnaire  Score:  Initial: 17/50  Most Recent: X/50 (Date: -- )   Interpretation of Score: Each section is scored on a 0-5 scale, 5 representing the greatest disability. The scores of each section are added together for a total score of 50. Medical Necessity:   · Patient is expected to demonstrate progress in strength, range of motion, balance and functional technique to improve safety during gait and improve tolerance for activity/IADLs. Reason for Services/Other Comments:  · Patient continues to require modification of therapeutic interventions to increase complexity of exercises. TREATMENT:   (In addition to Assessment/Re-Assessment sessions the following treatments were rendered)    Therapeutic Exercise: (see flow sheet below for minutes):  Exercises per grid below to improve mobility and strength. Required minimal visual, verbal and tactile cues to promote proper body alignment and promote proper body posture. Aquatic Therapy (see flow sheet below for minutes): Aquatic treatment performed per flow grid for Decreased muscle strength, Decreased static/dynamic balance and reactive control, Decreased range of motion, Decreased activity endurance and Decompression. Cues provided for technique. Date: 11/27/18 11/29/18  Visit 2 12/4/18  Visit 3 12/6/18  Visit 4    Modalities: 15 minutes 15 min      Pre-mod B mid thoracic paraspinals  One channel R/one channel L; pt in prone with pillow under abdomen with ice over thoracic region IFC with Ice to Thoracic in prone with pillow under abd.  Pt declined as reports no c/o pain                     Manual Therapy: 20 minutes  10 minutes     Soft tissue mobilization (STM) thoracic paraspinals; mid trap/rhomoboids/lower trap Pt in prone with pillow under abdomen  Focus of R mid thoracic paraspinals and lower trap; pt in prone     Epley Maneuver  To th L               Aquatic Exercise: next 60 min  1.5# 60 min 1.5# 60 min  2.5#    Gait F/B/S/M  x4L x4L with UE movements x4L UE movement    Heel/Toe walk        4-way        PF/DF        Hamstring Curls        Hip Flexion with knee ext.   (rockette)        Squats          SLR march        Lunges        Hip circles        Hip horizontal abduction/adduction        Vestibular Re-Ed: eyes fixed  x4L With Rotation R/L x2L  Then up/down with head  x2L same    Vestibular re-ed: eyes following shoulder movement  x4L x4L  x4L    Squats with UE movmemnt  x25 x25 flex/ext shlds x25 flex/ext shoulders    Saddle seating with 2 noodles: UE movement  x4L x2L F  x2L B x1L F/  x1L B    Shoulder glides keeping head and hips still  x20 x20     Pelvic tilts on noodle  x20 x20 x20    Core: tray walking push/pull with eyes fixed on target    x4L x4L x4L    UE row while in mini-squat   Paddles open  x15 Paddles open 2x15    UE horizontal abd/add in mini-squat   Paddles open  x15 Paddles x20    Core: UE shoulder abd/add    x15 open paddles    Core: partial tandem with UE flex/ext alternating     2x15 paddles open    Deep well bike  5 min 5 min 5 min    Deep well jumping geri  2 min 2 min 2 min    Deep well scissors  2 min 2 min 2 min    Deep well:  traction  6 min 5 min 3 min            Hamstring Stretch        Step Lunge        Piriformis stretch        PF Stretch        Balance: Single leg Stance        Balance: Tandem                          Therapeutic Exercise: 15 minutes       Mid thoracic stretch 3 H 15       Doorway pec stretch 3 H 15       scapt-retract x10       Transverse abdominous stabilization Pt in supine   10 H 5-10       bridging x10                       F=forward  B=Backward  S=sidesteps  M=marches    H=hold    Manual: (see flow sheet above for minutes) see above to decrease muscular tightness and release trigger points (held;may alternate sessions with manual or prn)  Modalities: (see flow sheet above for minutes) see above to decrease pain and muscular spasm; pt monitored and skin WNL (held due to no c/o pain)    HEP: Pt was instructed with above HEP during initial evaluation and issued handout. Treatment/Session Assessment:  Pt did not have any symptoms today and tolerated increased core/thoracic strengthening in aquatic environment. Plan to transition to land based strengthening next visit if continues to have improved symptoms. · Pain/ Symptoms: Initial:   0/10 mid thoracic constant ache that worsens with activity     \"I actually cleaned house after last session and I did not have any pain. I let my  do the sweeping and vacuuming, but I did laundry yesterday, and I didn't feel any pain. \"  No c/o vertigo episodes  Post Session:  0/10 mid thoracic   \"This is the best I've felt in a while. \" ·   Compliance with Program/Exercises: compliant  Recommendations/Intent for next treatment session: \"Next visit will focus on advancements to more challenging activities\". Continue using manual therapy as needed and advance aquatics next visit.     Total Treatment Duration:  PT Patient Time In/Time Out  Time In: 1055  Time Out: 1155     Janet Rosario, PT

## 2018-12-11 ENCOUNTER — HOSPITAL ENCOUNTER (OUTPATIENT)
Dept: PHYSICAL THERAPY | Age: 63
Discharge: HOME | End: 2018-12-11
Payer: COMMERCIAL

## 2018-12-11 PROCEDURE — 97014 ELECTRIC STIMULATION THERAPY: CPT

## 2018-12-11 PROCEDURE — 97110 THERAPEUTIC EXERCISES: CPT

## 2018-12-11 NOTE — PROGRESS NOTES
Helga Zhu  : [id number] formerly [facility],[address] @ [address]  [address] [address].  [phone]   [phone]        OUTPATIENT PHYSICAL THERAPY:Daily Note 2018        ICD-10: Treatment Diagnosis: Pain in thoracic spine (M54.6)  Benign paroxysmal vertigo, left ear (H81.12)  Precautions/Allergies:   Naprosyn [naproxen] and Sulfa (sulfonamide antibiotics)   Fall Risk Score:    Ambulatory/Rehab Services H2 Model Falls Risk Assessment    Risk Factors:       (1)  Dizziness/Vertigo       (1)  Visual Impairment [specify:  has bifocals but not wearing at time of exam] Ability to Rise from Chair:       (1)  Pushes up, successful in one attempt    Falls Prevention Plan:       No modifications necessary   Total: (5 or greater = High Risk): 3     [facility] of [employer]. All Rights Reserved. [facility] [employer] Patent #[id number]. Federal Law prohibits the replication, distribution or use without written permission from [facility] [employer]     MD Orders: PT Evaluate and Treat chronic mid-line thoracic back pain   Vestibular therapy for BPPV MEDICAL/REFERRING DIAGNOSIS:  No admission diagnoses are documented for this encounter. DATE OF ONSET: chronic over the past 1-2 years for thoracic pain  Vertigo 2018  REFERRING PHYSICIAN: Billy Helton MD  RETURN PHYSICIAN APPOINTMENT: 2019     INITIAL ASSESSMENT:  Ms. Palmer Case presents with chief c/o mid thoracic pain that increases as the day progresses with activity. Pt has poor posture and moderate scoliosis. Pt has difficulty with IADLs such as laundry, cooking, and other tasks such as washing dishes. Pt will benefit from postural/core strengthening to increase tolerance for lifting and activity.  Pt has also had brief intermittent episodes of vertigo and felt mild symptoms with MARIANO Wood; however, symptoms were brief and no nystagmus noted so unsure if truly BPPV and plan to further assess as pt is treated for her thoracic pain. Pt will benefit from vestibular rehab to decrease vertigo and decrease chance for falls. PROBLEM LIST (Impacting functional limitations):  1. Decreased ADL/Functional Activities  2. Decreased Ambulation Ability/Technique  3. Decreased Balance  4. Increased Pain  5. Decreased Activity Tolerance  6. Decreased Work Simplification/Energy Conservation Techniques  7. Decreased Flexibility/Joint Mobility  8. Decreased Abrams with Home Exercise Program INTERVENTIONS PLANNED:  1. Balance Exercise  2. Cold  3. Electrical Stimulation  4. Heat  5. Home Exercise Program (HEP)  6. Manual Therapy  7. Range of Motion (ROM)  8. Therapeutic Activites  9. Therapeutic Exercise/Strengthening  10. Ultrasound (US)  11. aquatics    TREATMENT PLAN:  Effective Dates: 11/27/2018 TO 2/25/2019 (90 days). Frequency/Duration: 2-3 times a week for 90 Days    GOALS: (Goals have been discussed and agreed upon with patient.)  Short-Term Functional Goals: Time Frame: 4 weeks  1. Pt will be able to report compliance with HEP. 2. Pt will report at least 25% less symptoms with her back during standing kitchen activities such as washing dishes and cooking. 3. Pt will be able to tolerate beginning core strengthening for at least 45 minutes in aquatic environment without increased symptoms to increase activity tolerance. Discharge Goals: Time Frame: 8 weeks  1. Pt will be able to single leg stance >10 seconds noting improving balance. 2. Pt will be able to tolerate cooking/cleaning her kitchen for at least 1 hour with minimal (<2/10) to no c/o.   3. Pt will be able to report no episodes of vertigo with all ADLs for at least 2-3 weeks in a row. 4. Pt will decrease Oswestry score 10/50 or less noting improving functional status.    Rehabilitation Potential For Stated Goals: Excellent  Regarding Helga Zhu's therapy, I certify that the treatment plan above will be carried out by a therapist or under their direction. Thank you for this referral,  Rakesh Edwards, ARABELLA                   HISTORY:   History of Present Injury/Illness (Reason for Referral):  Pt reports over the past 1-2 years her mid-back has been progressively worsened. Pt reports awaking in the morning is minimal to no symptoms; however, with just light household tasks pain begins to increase. Standing at the counter working in the kitchen or loading dishes into the  causes pain to escalate. As the day progresses symptoms can increase up to 10/10 if she does not sit and rest. Sitting relieves some of the pain. Pt also reports having very brief episodes of dizziness (reports spinning sensation) since March 2018. She saw an ENT and she has some mild hearing loss. He diagnosed her with BPPV (and possibly vertiginous migraine but was unlikely due to the short spells. She reports about 4 episodes over the past month--Once, coming out of [employer], once walking out of [employer], one episode sitting in her den, one episode while driving. Symptoms last only a couple of seconds but she is fearful she could fall. She has not had any falls. Pt does reports in the past having migraine headaches but is taking topiramate and symptoms have mostly resolved. She has occasional tension headaches but denies headaches during vertigo episodes and denies cervical pain.    Past Medical History/Comorbidities:   Ms. Dylan Rodriguez  has a past medical history of Backache, unspecified, Bleeding hemorrhoid, Breast cancer ([address] Degeneration of thoracic or thoracolumbar intervertebral disc, Disturbance of skin sensation, Gastrointestinal disorder, GERD (gastroesophageal reflux disease), Herpes simplex, Herpes simplex without mention of complication, Hyperlipidemia, Impaired fasting glucose, Intercostal neuritis, Malignant neoplasm of breast (female), unspecified site, Migraine, Migraine with aura, without mention of intractable migraine without mention of status migrainosus, Mixed hyperlipidemia, 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 Ms. Barrington Canavan  has a past surgical history that includes hx cholecystectomy (2004); hx hysterectomy; hx orthopaedic; hx tonsillectomy; pr breast surgery procedure unlisted (06/12/2014); and hx colonoscopy (08/22/2016). Social History/Living Environment:    lives in 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 , Disp: 30 g, Rfl: 12    DULoxetine (CYMBALTA) 60 mg capsule, Take 1 Cap by mouth daily. , Disp: 90 Cap, Rfl: 4    topiramate (TOPAMAX) 25 mg tablet, Take 1 Tab by mouth daily (with breakfast). , Disp: 90 Tab, Rfl: 4    pantoprazole (PROTONIX) 40 mg tablet, Take 1 Tab by mouth daily. , Disp: 90 Tab, Rfl: 4    SUMAtriptan (IMITREX) 100 mg tablet, Take 1 Tab by mouth once as needed for Migraine. , Disp: 9 Tab, Rfl: 12    atorvastatin (LIPITOR) 10 mg tablet, Take 1 Tab by mouth daily. , Disp: 90 Tab, Rfl: 4    tiZANidine (ZANAFLEX) 4 mg tablet, Take 1 Tab by mouth two (2) times daily as needed. , Disp: 60 Tab, Rfl: 6    magnesium 250 mg tab, Take 1 Tab by mouth daily. , Disp: , Rfl:     garlic 1,100 mg cap, Take 1 Cap by mouth daily. , Disp: , Rfl:     Biotin 5 mg tab, Take 1 Tab by mouth daily. , Disp: , Rfl:     multivitamin (ONE A DAY) tablet, Take 1 Tab by mouth daily. , Disp: , Rfl:     GLUCOSAMINE HCL/CHONDR SALAS A NA (GLUCOSAMINE-CHONDROITIN) 750-600 mg tab, Take 2 Tabs by mouth daily. , Disp: , Rfl:     cholecalciferol, vitamin D3, (VITAMIN D3) 2,000 unit tab, Take 2 Tabs by mouth daily. , Disp: , Rfl:     cetirizine (ZYRTEC) 10 mg tablet, Take 10 mg by mouth daily. , Disp: , Rfl:    Date Last Reviewed:  12/11/2018   EXAMINATION:   Observation/Orthostatic Postural Assessment:          Moderate scoliotic S curve through lower thoracic area; mod rounded shoulders with FHP with increased cervicothoracic hump  Palpation:          Tender R>L mid thoracic paraspinals with tightness and trigger points noted R thoracic paraspinals  ROM:  UEs and LEs WFL  Cervical AROM WFL  Trunk Flexion to ankles but pain in thoracic area upon return to standing  Trunk extension WFL  Sidebending WFL  Rotation to R 25% limited and painful   to L mild \"twinge\" but ROM WFL                  Strength:  UEs grossly 4+/5 except B wrist extension 4/5; BLE hip flexion 4+/5; knees 4+/5; ankle DF 4+/5  Special Tests: Smooth Pursuits WNL; Saccades WNL; Head Thrust (-); Head Shaking (-); [employer] (L had brief dizziness lasting less than 5 seconds and no nystagmus noted); [facility] R (-)  Neurological Screen:        Sensation: denies numbness/tingling and intact   Functional Mobility:         Gait/Ambulation:  Independent; reports if has an episode of vertigo she if fearful of falling but not today        Transfers:  Independent with min use of UEs        Bed Mobility:  Independent but mild increased time to get into prone  Balance:          Single leg stance <3 seconds bilaterally   Body Structures Involved:  1. Eyes and Ears  2. Joints  3. Muscles Body Functions Affected:  1. Sensory/Pain  2. Neuromusculoskeletal  3. Movement Related  4. Vestibular Activities and Participation Affected:  1. General Tasks and Demands  2. Mobility  3. Domestic Life  4.  Community, Social and Civic Life   CLINICAL PRESENTATION:   CLINICAL DECISION MAKING:   Outcome Measure: Tool Used: Modified Oswestry Low Back Pain Questionnaire  Score:  Initial: 17/50  Most Recent: X/50 (Date: -- )   Interpretation of Score: Each section is scored on a 0-5 scale, 5 representing the greatest disability. The scores of each section are added together for a total score of 50. Medical Necessity:   · Patient is expected to demonstrate progress in strength, range of motion, balance and functional technique to improve safety during gait and improve tolerance for activity/IADLs. Reason for Services/Other Comments:  · Patient continues to require modification of therapeutic interventions to increase complexity of exercises. TREATMENT:   (In addition to Assessment/Re-Assessment sessions the following treatments were rendered)    Therapeutic Exercise: (see flow sheet below for minutes):  Exercises per grid below to improve mobility and strength. Required minimal visual, verbal and tactile cues to promote proper body alignment and promote proper body posture. Aquatic Therapy (see flow sheet below for minutes): Aquatic treatment performed per flow grid for Decreased muscle strength, Decreased static/dynamic balance and reactive control, Decreased range of motion, Decreased activity endurance and Decompression. Cues provided for technique. Date: 11/27/18 11/29/18  Visit 2 12/4/18  Visit 3 12/6/18  Visit 4 12/11/18  Visit 5   Modalities: 15 minutes 15 min   15 min   Pre-mod B mid thoracic paraspinals  One channel R/one channel L; pt in prone with pillow under abdomen with ice over thoracic region IFC with Ice to Thoracic in prone with pillow under abd. Pt declined as reports no c/o pain  IFC with ice to thoracic in prone with pillow under abd.                    Manual Therapy: 20 minutes  10 minutes     Soft tissue mobilization (STM) thoracic paraspinals; mid trap/rhomoboids/lower trap Pt in prone with pillow under abdomen  Focus of R mid thoracic paraspinals and lower trap; pt in prone     Epley Maneuver  To th L               Aquatic Exercise: next 60 min  1.5# 60 min 1.5# 60 min  2.5#    Gait F/B/S/M  x4L x4L with UE movements x4L UE movement    Heel/Toe walk        4-way        PF/DF        Hamstring Curls        Hip Flexion with knee ext.   (rockette)        Squats          SLR march        Lunges        Hip circles        Hip horizontal abduction/adduction        Vestibular Re-Ed: eyes fixed  x4L With Rotation R/L x2L  Then up/down with head  x2L same    Vestibular re-ed: eyes following shoulder movement  x4L x4L  x4L    Squats with UE movmemnt  x25 x25 flex/ext shlds x25 flex/ext shoulders    Saddle seating with 2 noodles: UE movement  x4L x2L F  x2L B x1L F/  x1L B    Shoulder glides keeping head and hips still  x20 x20     Pelvic tilts on noodle  x20 x20 x20    Core: tray walking push/pull with eyes fixed on target    x4L x4L x4L    UE row while in mini-squat   Paddles open  x15 Paddles open 2x15    UE horizontal abd/add in mini-squat   Paddles open  x15 Paddles x20    Core: UE shoulder abd/add    x15 open paddles    Core: partial tandem with UE flex/ext alternating     2x15 paddles open    Deep well bike  5 min 5 min 5 min    Deep well jumping geri  2 min 2 min 2 min    Deep well scissors  2 min 2 min 2 min    Deep well:  traction  6 min 5 min 3 min            Hamstring Stretch        Step Lunge        Piriformis stretch        PF Stretch        Balance: Single leg Stance        Balance: Tandem                          Therapeutic Exercise: 15 minutes    60 min   Mid thoracic stretch 3 H 15    3H15   Doorway pec stretch 3 H 15    3H15   scapt-retract x10       Transverse abdominous stabilization Pt in supine   10 H 5-10    Pt in supine   10 H 5-10   bridging x10    x10   Single knee to chest     x10BLE   Cat/camel     x10H5   Seated thoracic lumbar extension     x10   Thoracic mobilization on foam roll     x10   Seated thoracic flexion and rotation with arms crossed     x7H10   Rows seated with red theraband     x15   Quadruped thracic rotation with full range with hand on neck     x5BUE   Seated shoulder c external rotation      Red band x 15   Scaption with theraband     Red band x 15BUE                           F=forward  B=Backward  S=sidesteps  M=marches    H=hold        Manual: (see flow sheet above for minutes) see above to decrease muscular tightness and release trigger points (held;may alternate sessions with manual or prn)  Modalities: (see flow sheet above for minutes) see above to decrease pain and muscular spasm; pt monitored and skin WNL (held due to no c/o pain)    HEP: Pt was instructed with above HEP during initial evaluation and issued handout. Treatment/Session Assessment:  Pt progressed to land therex per flowsheet above. Added several strengthening and stretches to thoracic. Pt tolerated additions well. Pt has 4 scheduled appts, discussed with pt about joining an aquatic/land based gym. Pt very open to joining with her sister. Printed HEP and issued red theraband. · Pain/ Symptoms: Initial:   0/10 mid thoracic constant ache that worsens with activity     \"I have only had one vertigo episode on Sat and it went away very quickly. Post Session:  0/10 mid thoracic   \"I really like those exercises you added\" ·   Compliance with Program/Exercises: compliant  Recommendations/Intent for next treatment session: \"Next visit will focus on advancements to more challenging activities\". Continue to advance land therex next visit.     Total Treatment Duration:  PT Patient Time In/Time Out  Time In: 1015  Time Out: [facility]

## 2018-12-13 ENCOUNTER — HOSPITAL ENCOUNTER (OUTPATIENT)
Dept: PHYSICAL THERAPY | Age: 63
Discharge: HOME | End: 2018-12-13
Payer: COMMERCIAL

## 2018-12-13 PROCEDURE — 97140 MANUAL THERAPY 1/> REGIONS: CPT | Performed by: PHYSICAL THERAPIST

## 2018-12-13 PROCEDURE — 97110 THERAPEUTIC EXERCISES: CPT | Performed by: PHYSICAL THERAPIST

## 2018-12-13 PROCEDURE — 97014 ELECTRIC STIMULATION THERAPY: CPT | Performed by: PHYSICAL THERAPIST

## 2018-12-13 NOTE — PROGRESS NOTES
Helga Zhu  : [id number] [facility],[address] @ [address]  [address] [address].  [phone]   [phone]        OUTPATIENT PHYSICAL THERAPY:Daily Note 2018        ICD-10: Treatment Diagnosis: Pain in thoracic spine (M54.6)  Benign paroxysmal vertigo, left ear (H81.12)  Precautions/Allergies:   Naprosyn [naproxen] and Sulfa (sulfonamide antibiotics)   Fall Risk Score:    Ambulatory/Rehab Services H2 Model Falls Risk Assessment    Risk Factors:       (1)  Dizziness/Vertigo       (1)  Visual Impairment [specify:  has bifocals but not wearing at time of exam] Ability to Rise from Chair:       (1)  Pushes up, successful in one attempt    Falls Prevention Plan:       No modifications necessary   Total: (5 or greater = High Risk): 3     [facility] [employer]. All Rights Reserved. [facility] [employer] Patent #[id number]. Federal Law prohibits the replication, distribution or use without written permission from [facility] [employer]     MD Orders: PT Evaluate and Treat chronic mid-line thoracic back pain   Vestibular therapy for BPPV MEDICAL/REFERRING DIAGNOSIS:  Pain in thoracic spine [M54.6]   DATE OF ONSET: chronic over the past 1-2 years for thoracic pain  Vertigo 2018  REFERRING PHYSICIAN: Alona Monroe MD  RETURN PHYSICIAN APPOINTMENT: 2019     INITIAL ASSESSMENT:  Ms. Isac Liu presents with chief c/o mid thoracic pain that increases as the day progresses with activity. Pt has poor posture and moderate scoliosis. Pt has difficulty with IADLs such as laundry, cooking, and other tasks such as washing dishes. Pt will benefit from postural/core strengthening to increase tolerance for lifting and activity.  Pt has also had brief intermittent episodes of vertigo and felt mild symptoms with L Jeremiah Horsfall; however, symptoms were brief and no nystagmus noted so unsure if truly BPPV and plan to further assess as pt is treated for her thoracic pain. Pt will benefit from vestibular rehab to decrease vertigo and decrease chance for falls. PROBLEM LIST (Impacting functional limitations):  1. Decreased ADL/Functional Activities  2. Decreased Ambulation Ability/Technique  3. Decreased Balance  4. Increased Pain  5. Decreased Activity Tolerance  6. Decreased Work Simplification/Energy Conservation Techniques  7. Decreased Flexibility/Joint Mobility  8. Decreased [place] with Home Exercise Program INTERVENTIONS PLANNED:  1. Balance Exercise  2. Cold  3. Electrical Stimulation  4. Heat  5. Home Exercise Program (HEP)  6. Manual Therapy  7. Range of Motion (ROM)  8. Therapeutic Activites  9. Therapeutic Exercise/Strengthening  10. Ultrasound (US)  11. aquatics    TREATMENT PLAN:  Effective Dates: 11/27/2018 TO 2/25/2019 (90 days). Frequency/Duration: 2-3 times a week for 90 Days    GOALS: (Goals have been discussed and agreed upon with patient.)  Short-Term Functional Goals: Time Frame: 4 weeks  1. Pt will be able to report compliance with HEP. 2. Pt will report at least 25% less symptoms with her back during standing kitchen activities such as washing dishes and cooking. 3. Pt will be able to tolerate beginning core strengthening for at least 45 minutes in aquatic environment without increased symptoms to increase activity tolerance. Discharge Goals: Time Frame: 8 weeks  1. Pt will be able to single leg stance >10 seconds noting improving balance. 2. Pt will be able to tolerate cooking/cleaning her kitchen for at least 1 hour with minimal (<2/10) to no c/o.   3. Pt will be able to report no episodes of vertigo with all ADLs for at least 2-3 weeks in a row. 4. Pt will decrease Oswestry score 10/50 or less noting improving functional status. Rehabilitation Potential For Stated Goals: Excellent  Regarding Helga Zhu's therapy, I certify that the treatment plan above will be carried out by a therapist or under their direction.   Thank you for this referral,  Janet Rosario, PT                   HISTORY:   History of Present Injury/Illness (Reason for Referral):  Pt reports over the past 1-2 years her mid-back has been progressively worsened. Pt reports awaking in the morning is minimal to no symptoms; however, with just light household tasks pain begins to increase. Standing at the counter working in the kitchen or loading dishes into the  causes pain to escalate. As the day progresses symptoms can increase up to 10/10 if she does not sit and rest. Sitting relieves some of the pain. Pt also reports having very brief episodes of dizziness (reports spinning sensation) since March 2018. She saw an ENT and she has some mild hearing loss. He diagnosed her with BPPV (and possibly vertiginous migraine but was unlikely due to the short spells. She reports about 4 episodes over the past month--Once, coming out of [employer], once walking out of [facility], one episode sitting in her den, one episode while driving. Symptoms last only a couple of seconds but she is fearful she could fall. She has not had any falls. Pt does reports in the past having migraine headaches but is taking topiramate and symptoms have mostly resolved. She has occasional tension headaches but denies headaches during vertigo episodes and denies cervical pain.    Past Medical History/Comorbidities:   Ms. 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 Ms. Kyler Chávez  has a past surgical history that includes hx cholecystectomy (2004); hx hysterectomy; hx orthopaedic; hx tonsillectomy; pr breast surgery procedure unlisted (06/12/2014); and hx colonoscopy (08/22/2016). 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 , Disp: 30 g, Rfl: 12    DULoxetine (CYMBALTA) 60 mg capsule, Take 1 Cap by mouth daily. , Disp: 90 Cap, Rfl: 4    topiramate (TOPAMAX) 25 mg tablet, Take 1 Tab by mouth daily (with breakfast). , Disp: 90 Tab, Rfl: 4    pantoprazole (PROTONIX) 40 mg tablet, Take 1 Tab by mouth daily. , Disp: 90 Tab, Rfl: 4    SUMAtriptan (IMITREX) 100 mg tablet, Take 1 Tab by mouth once as needed for Migraine. , Disp: 9 Tab, Rfl: 12    atorvastatin (LIPITOR) 10 mg tablet, Take 1 Tab by mouth daily. , Disp: 90 Tab, Rfl: 4    tiZANidine (ZANAFLEX) 4 mg tablet, Take 1 Tab by mouth two (2) times daily as needed. , Disp: 60 Tab, Rfl: 6    magnesium 250 mg tab, Take 1 Tab by mouth daily. , Disp: , Rfl:     garlic 3,594 mg cap, Take 1 Cap by mouth daily. , Disp: , Rfl:     Biotin 5 mg tab, Take 1 Tab by mouth daily. , Disp: , Rfl:     multivitamin (ONE A DAY) tablet, Take 1 Tab by mouth daily. , Disp: , Rfl:     GLUCOSAMINE HCL/CHONDR MARITZA LYMAN (GLUCOSAMINE-CHONDROITIN) 750-600 mg tab, Take 2 Tabs by mouth daily. , Disp: , Rfl:     cholecalciferol, vitamin D3, (VITAMIN D3) 2,000 unit tab, Take 2 Tabs by mouth daily. , Disp: , Rfl:     cetirizine (ZYRTEC) 10 mg tablet, Take 10 mg by mouth daily. , Disp: , Rfl:    Date Last Reviewed:  12/13/2018   EXAMINATION:   Observation/Orthostatic Postural Assessment:          Moderate scoliotic S curve through lower thoracic area; mod rounded shoulders with FHP with increased cervicothoracic hump  Palpation:          Tender R>L mid thoracic paraspinals with tightness and trigger points noted R thoracic paraspinals  ROM:  UEs and LEs WFL  Cervical AROM WFL  Trunk Flexion to ankles but pain in thoracic area upon return to standing  Trunk extension WFL  Sidebending WFL  Rotation to R 25% limited and painful   to L mild \"twinge\" but ROM WFL                  Strength:  UEs grossly 4+/5 except B wrist extension 4/5; BLE hip flexion 4+/5; knees 4+/5; ankle DF 4+/5  Special Tests: Smooth Pursuits WNL; Saccades WNL; Head Thrust (-); Head Shaking (-); [employer] (L had brief dizziness lasting less than 5 seconds and no nystagmus noted); [facility] R (-)  Neurological Screen:        Sensation: denies numbness/tingling and intact   Functional Mobility:         Gait/Ambulation:  Independent; reports if has an episode of vertigo she if fearful of falling but not today        Transfers:  Independent with min use of UEs        Bed Mobility:  Independent but mild increased time to get into prone  Balance:          Single leg stance <3 seconds bilaterally   Body Structures Involved:  1. Eyes and Ears  2. Joints  3. Muscles Body Functions Affected:  1. Sensory/Pain  2. Neuromusculoskeletal  3. Movement Related  4. Vestibular Activities and Participation Affected:  1. General Tasks and Demands  2. Mobility  3. Domestic Life  4.  Community, Social and Civic Life   CLINICAL PRESENTATION:   CLINICAL DECISION MAKING:   Outcome Measure: Tool Used: Modified Oswestry Low Back Pain Questionnaire  Score:  Initial: 17/50  Most Recent: X/50 (Date: -- )   Interpretation of Score: Each section is scored on a 0-5 scale, 5 representing the greatest disability. The scores of each section are added together for a total score of 50. Medical Necessity:   · Patient is expected to demonstrate progress in strength, range of motion, balance and functional technique to improve safety during gait and improve tolerance for activity/IADLs. Reason for Services/Other Comments:  · Patient continues to require modification of therapeutic interventions to increase complexity of exercises. TREATMENT:   (In addition to Assessment/Re-Assessment sessions the following treatments were rendered)    Therapeutic Exercise: (see flow sheet below for minutes):  Exercises per grid below to improve mobility and strength. Required minimal visual, verbal and tactile cues to promote proper body alignment and promote proper body posture. Aquatic Therapy (see flow sheet below for minutes): Aquatic treatment performed per flow grid for Decreased muscle strength, Decreased static/dynamic balance and reactive control, Decreased range of motion, Decreased activity endurance and Decompression. Cues provided for technique. Date: 11/27/18 11/29/18  Visit 2 12/4/18  Visit 3 12/6/18  Visit 4 12/11/18  Visit 5 12/13/18  Visit 6   Modalities: 15 minutes 15 min   15 min 15 minutes   Pre-mod B mid thoracic paraspinals  One channel R/one channel L; pt in prone with pillow under abdomen with ice over thoracic region IFC with Ice to Thoracic in prone with pillow under abd. Pt declined as reports no c/o pain  IFC with ice to thoracic in prone with pillow under abd.  Pt in prone with pillow under abdomen;  Pre-mod B mid thoracic paraspinals with MHP prior to exercise                     Manual Therapy: 20 minutes  10 minutes   10 minutes   Soft tissue mobilization (STM) thoracic paraspinals; mid trap/rhomoboids/lower trap Pt in prone with pillow under abdomen  Focus of R mid thoracic paraspinals and lower trap; pt in prone   Same; with focus of R mid thoracic trigger point   Epley Maneuver  To th L                 Aquatic Exercise: next 60 min  1.5# 60 min 1.5# 60 min  2.5#     Gait F/B/S/M  x4L x4L with UE movements x4L UE movement     Heel/Toe walk         4-way         PF/DF         Hamstring Curls         Hip Flexion with knee ext.   (rockette)         Squats           SLR march         Lunges         Hip circles         Hip horizontal abduction/adduction         Vestibular Re-Ed: eyes fixed  x4L With Rotation R/L x2L  Then up/down with head  x2L same     Vestibular re-ed: eyes following shoulder movement  x4L x4L  x4L     Squats with UE movmemnt  x25 x25 flex/ext shlds x25 flex/ext shoulders     Saddle seating with 2 noodles: UE movement  x4L x2L F  x2L B x1L F/  x1L B     Shoulder glides keeping head and hips still  x20 x20      Pelvic tilts on noodle  x20 x20 x20     Core: tray walking push/pull with eyes fixed on target    x4L x4L x4L     UE row while in mini-squat   Paddles open  x15 Paddles open 2x15     UE horizontal abd/add in mini-squat   Paddles open  x15 Paddles x20     Core: UE shoulder abd/add    x15 open paddles     Core: partial tandem with UE flex/ext alternating     2x15 paddles open     Deep well bike  5 min 5 min 5 min     Deep well jumping geri  2 min 2 min 2 min     Deep well scissors  2 min 2 min 2 min     Deep well:  traction  6 min 5 min 3 min              Hamstring Stretch         Step Lunge         Piriformis stretch         PF Stretch         Balance: Single leg Stance         Balance: Tandem                             Therapeutic Exercise: 15 minutes    60 min 35 minutes   Mid thoracic stretch 3 H 15    3H15    Doorway pec stretch 3 H 15    3H15 3 H 20   scapt-retract x10        Transverse abdominous stabilization Pt in supine   10 H 5-10    Pt in supine   10 H 5-10 10 H 10 in supine   bridging x10    x10 x15 H3-5   Single knee to chest     x10BLE x10 BLE   Cat/camel     x10H5 x10 H5   Seated thoracic lumbar extension     x10    Thoracic mobilization on foam roll     x10    Seated thoracic flexion and rotation with arms crossed     x7H10    Rows seated with red theraband     x15 Red 2x10   Quadruped thracic rotation with full range with hand on neck     x5BUE    Quadruped TA stab       With alternating flex/ext UEs  2x10   Seated shoulder c external rotation      Red band x 15 Red band 2x10    Scaption with theraband     Red band x 15BUE 2# 2x10 BUE   Lower trunk rotations      x15   Prayer stretch       2 H 20 with sidebend to R/L   Shoulder extension with extended elbow and focus of TA stabs during exercise      B with green t-band  2x10   F=forward  B=Backward  S=sidesteps  M=marches    H=hold        Manual: (see flow sheet above for minutes) see above to decrease muscular tightness and release trigger points (held;may alternate sessions with manual or prn)  Modalities: (see flow sheet above for minutes) see above to decrease pain and muscular spasm; pt monitored and skin WNL (held due to no c/o pain)    HEP: Pt was instructed with above HEP during initial evaluation and issued handout. Treatment/Session Assessment:  Pt had significantly increased symptoms after last session. Used MHP with e-stim prior to session. Pt wants to be able to progress land exercises and continued with core/thoracic strengthening and thoracic stretches but decreased amount of rotational exercises and pt did not have any c/o increased pain. Pt had trigger point in R mid-thoracic paraspinal and lower trap region that  with STM and trigger point release. Pt was instructed to gradually add back her HEP after seeing how her symptoms are tomorrow.        · Pain/ Symptoms: Initial:   2/10 mid thoracic constant ache that worsens with activity     \"I was in a lot of pain on Tuesday evening, but yesterday was not bad. I only feel some soreness today in my mid back. The dizziness seems better and I haven't had any episodes since Sat. \" Post Session:  0/10 mid thoracic   \"I do not have any pain now. We will see how it feels later. I think I will be good today. \" ·   Compliance with Program/Exercises: compliant  Recommendations/Intent for next treatment session: \"Next visit will focus on advancements to more challenging activities\". Continue to advance land therex next visit, using manual therapy and modalities as needed.     Total Treatment Duration:  PT Patient Time In/Time Out  Time In: 1055  Time Out: 1155     Janet Rosario, PT

## 2018-12-18 ENCOUNTER — HOSPITAL ENCOUNTER (OUTPATIENT)
Dept: PHYSICAL THERAPY | Age: 63
Discharge: HOME | End: 2018-12-18
Payer: COMMERCIAL

## 2018-12-20 ENCOUNTER — HOSPITAL ENCOUNTER (OUTPATIENT)
Dept: PHYSICAL THERAPY | Age: 63
Discharge: HOME | End: 2018-12-20
Payer: COMMERCIAL

## 2018-12-20 PROCEDURE — 97110 THERAPEUTIC EXERCISES: CPT

## 2018-12-20 PROCEDURE — 97014 ELECTRIC STIMULATION THERAPY: CPT

## 2018-12-20 NOTE — THERAPY DISCHARGE
Helga Zhu  : [id number] [facility],[address] @ [address]  [address] [place] [address].  [phone]   [phone]        OUTPATIENT PHYSICAL THERAPY:Daily Note and Discharge 2018        ICD-10: Treatment Diagnosis: Pain in thoracic spine (M54.6)  Benign paroxysmal vertigo, left ear (H81.12)  Precautions/Allergies:   Naprosyn [naproxen] and Sulfa (sulfonamide antibiotics)   Fall Risk Score:    Ambulatory/Rehab Services H2 Model Falls Risk Assessment    Risk Factors:       (1)  Dizziness/Vertigo       (1)  Visual Impairment [specify:  has bifocals but not wearing at time of exam] Ability to Rise from Chair:       (1)  Pushes up, successful in one attempt    Falls Prevention Plan:       No modifications necessary   Total: (5 or greater = High Risk): 3     [facility]. All Rights Reserved. [facility] [email or web address] Patent #[id number]. Federal Law prohibits the replication, distribution or use without written permission from [facility] [employer]     MD Orders: PT Evaluate and Treat chronic mid-line thoracic back pain   Vestibular therapy for BPPV MEDICAL/REFERRING DIAGNOSIS:  Pain in thoracic spine [M54.6]   DATE OF ONSET: chronic over the past 1-2 years for thoracic pain  Vertigo 2018  REFERRING PHYSICIAN: Angela Toribio MD  RETURN PHYSICIAN APPOINTMENT: 2019     INITIAL ASSESSMENT:  Ms. Sharifa Hazel presents with chief c/o mid thoracic pain that increases as the day progresses with activity. Pt has poor posture and moderate scoliosis. Pt has difficulty with IADLs such as laundry, cooking, and other tasks such as washing dishes. Pt will benefit from postural/core strengthening to increase tolerance for lifting and activity.  Pt has also had brief intermittent episodes of vertigo and felt mild symptoms with L Easter ; however, symptoms were brief and no nystagmus noted so unsure if truly BPPV and plan to further assess as pt is treated for her thoracic pain. Pt will benefit from vestibular rehab to decrease vertigo and decrease chance for falls. PROBLEM LIST (Impacting functional limitations):  1. Decreased ADL/Functional Activities  2. Decreased Ambulation Ability/Technique  3. Decreased Balance  4. Increased Pain  5. Decreased Activity Tolerance  6. Decreased Work Simplification/Energy Conservation Techniques  7. Decreased Flexibility/Joint Mobility  8. Decreased [place] with Home Exercise Program INTERVENTIONS PLANNED:  1. Balance Exercise  2. Cold  3. Electrical Stimulation  4. Heat  5. Home Exercise Program (HEP)  6. Manual Therapy  7. Range of Motion (ROM)  8. Therapeutic Activites  9. Therapeutic Exercise/Strengthening  10. Ultrasound (US)  11. aquatics    TREATMENT PLAN:  Effective Dates: 11/27/2018 TO 2/25/2019 (90 days). Frequency/Duration: 2-3 times a week for 90 Days    GOALS: (Goals have been discussed and agreed upon with patient.)  Short-Term Functional Goals: Time Frame: 4 weeks  1. Pt will be able to report compliance with HEP. (MET 12/20/18)  2. Pt will report at least 25% less symptoms with her back during standing kitchen activities such as washing dishes and cooking. (MET 12/20/18)  3. Pt will be able to tolerate beginning core strengthening for at least 45 minutes in aquatic environment without increased symptoms to increase activity tolerance. (MET 12/20/18)  Discharge Goals: Time Frame: 8 weeks  1. Pt will be able to single leg stance >10 seconds noting improving balance. (MET 12/20/18)  2. Pt will be able to tolerate cooking/cleaning her kitchen for at least 1 hour with minimal (<2/10) to no c/o. (MET 12/20/18)  3. Pt will be able to report no episodes of vertigo with all ADLs for at least 2-3 weeks in a row. (MET 12/20/18)  4. Pt will decrease Oswestry score 10/50 or less noting improving functional status.  (MET 12/20/18)  Rehabilitation Potential For Stated Goals: Excellent  Regarding Helga Zhu's therapy, I certify that the treatment plan above will be carried out by a therapist or under their direction. Thank you for this referral,  Yola Chew, PTA                   HISTORY:   History of Present Injury/Illness (Reason for Referral):  Pt reports over the past 1-2 years her mid-back has been progressively worsened. Pt reports awaking in the morning is minimal to no symptoms; however, with just light household tasks pain begins to increase. Standing at the counter working in the kitchen or loading dishes into the  causes pain to escalate. As the day progresses symptoms can increase up to 10/10 if she does not sit and rest. Sitting relieves some of the pain. Pt also reports having very brief episodes of dizziness (reports spinning sensation) since March 2018. She saw an ENT and she has some mild hearing loss. He diagnosed her with BPPV (and possibly vertiginous migraine but was unlikely due to the short spells. She reports about 4 episodes over the past month--Once, coming out of [employer], once walking out of [place], one episode sitting in her den, one episode while driving. Symptoms last only a couple of seconds but she is fearful she could fall. She has not had any falls. Pt does reports in the past having migraine headaches but is taking topiramate and symptoms have mostly resolved. She has occasional tension headaches but denies headaches during vertigo episodes and denies cervical pain.    Past Medical History/Comorbidities:   Ms. 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 Ms. Harish Jaurez  has a past surgical history that includes hx cholecystectomy (2004); hx hysterectomy; hx orthopaedic; hx tonsillectomy; pr breast surgery procedure unlisted (06/12/2014); and hx colonoscopy (08/22/2016). 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 , Disp: 30 g, Rfl: 12    DULoxetine (CYMBALTA) 60 mg capsule, Take 1 Cap by mouth daily. , Disp: 90 Cap, Rfl: 4    topiramate (TOPAMAX) 25 mg tablet, Take 1 Tab by mouth daily (with breakfast). , Disp: 90 Tab, Rfl: 4    pantoprazole (PROTONIX) 40 mg tablet, Take 1 Tab by mouth daily. , Disp: 90 Tab, Rfl: 4    SUMAtriptan (IMITREX) 100 mg tablet, Take 1 Tab by mouth once as needed for Migraine. , Disp: 9 Tab, Rfl: 12    atorvastatin (LIPITOR) 10 mg tablet, Take 1 Tab by mouth daily. , Disp: 90 Tab, Rfl: 4    tiZANidine (ZANAFLEX) 4 mg tablet, Take 1 Tab by mouth two (2) times daily as needed. , Disp: 60 Tab, Rfl: 6    magnesium 250 mg tab, Take 1 Tab by mouth daily. , Disp: , Rfl:     garlic 7,894 mg cap, Take 1 Cap by mouth daily. , Disp: , Rfl:     Biotin 5 mg tab, Take 1 Tab by mouth daily. , Disp: , Rfl:     multivitamin (ONE A DAY) tablet, Take 1 Tab by mouth daily. , Disp: , Rfl:     GLUCOSAMINE HCL/CHONDR SALAS A NA (GLUCOSAMINE-CHONDROITIN) 750-600 mg tab, Take 2 Tabs by mouth daily. , Disp: , Rfl:     cholecalciferol, vitamin D3, (VITAMIN D3) 2,000 unit tab, Take 2 Tabs by mouth daily. , Disp: , Rfl:     cetirizine (ZYRTEC) 10 mg tablet, Take 10 mg by mouth daily. , Disp: , Rfl:    Date Last Reviewed:  12/20/2018   EXAMINATION:   Observation/Orthostatic Postural Assessment:          Moderate scoliotic S curve through lower thoracic area; mod rounded shoulders with FHP with increased cervicothoracic hump  Palpation:          Tender R>L mid thoracic paraspinals with tightness and trigger points noted R thoracic paraspinals  ROM:  UEs and LEs WFL  Cervical AROM WFL  Trunk Flexion to ankles but pain in thoracic area upon return to standing  Trunk extension WFL  Sidebending WFL  Rotation to R 25% limited and painful   to L mild \"twinge\" but ROM WFL                  Strength:  UEs grossly 4+/5 except B wrist extension 4/5; BLE hip flexion 4+/5; knees 4+/5; ankle DF 4+/5  Special Tests: Smooth Pursuits WNL; Saccades WNL; Head Thrust (-); Head Shaking (-); [facility] [facility] (L had brief dizziness lasting less than 5 seconds and no nystagmus noted); [facility] R (-)  Neurological Screen:        Sensation: denies numbness/tingling and intact   Functional Mobility:         Gait/Ambulation:  Independent; reports if has an episode of vertigo she if fearful of falling but not today        Transfers:  Independent with min use of UEs        Bed Mobility:  Independent but mild increased time to get into prone  Balance:          Single leg stance <3 seconds bilaterally   Body Structures Involved:  1. Eyes and Ears  2. Joints  3. Muscles Body Functions Affected:  1. Sensory/Pain  2. Neuromusculoskeletal  3. Movement Related  4. Vestibular Activities and Participation Affected:  1. General Tasks and Demands  2. Mobility  3.  Domestic Life  4. Community, Social and Civic Life   CLINICAL PRESENTATION:   CLINICAL DECISION MAKING:   Outcome Measure: Tool Used: Modified Oswestry Low Back Pain Questionnaire  Score:  Initial: 17/50  Most Recent: 6/50 (Date: 12/20/18 )   Interpretation of Score: Each section is scored on a 0-5 scale, 5 representing the greatest disability. The scores of each section are added together for a total score of 50. Medical Necessity:   · Patient is expected to demonstrate progress in strength, range of motion, balance and functional technique to improve safety during gait and improve tolerance for activity/IADLs. Reason for Services/Other Comments:  · Patient continues to require modification of therapeutic interventions to increase complexity of exercises. TREATMENT:   (In addition to Assessment/Re-Assessment sessions the following treatments were rendered)    Therapeutic Exercise: (see flow sheet below for minutes):  Exercises per grid below to improve mobility and strength. Required minimal visual, verbal and tactile cues to promote proper body alignment and promote proper body posture. Aquatic Therapy (see flow sheet below for minutes): Aquatic treatment performed per flow grid for Decreased muscle strength, Decreased static/dynamic balance and reactive control, Decreased range of motion, Decreased activity endurance and Decompression. Cues provided for technique. Date: 11/27/18 11/29/18  Visit 2 12/4/18  Visit 3 12/6/18  Visit 4 12/11/18  Visit 5 12/13/18  Visit 6 12/20/18  Visit 7   Modalities: 15 minutes 15 min   15 min 15 minutes  15 min   Pre-mod B mid thoracic paraspinals  One channel R/one channel L; pt in prone with pillow under abdomen with ice over thoracic region IFC with Ice to Thoracic in prone with pillow under abd. Pt declined as reports no c/o pain  IFC with ice to thoracic in prone with pillow under abd.  Pt in prone with pillow under abdomen;  Pre-mod B mid thoracic paraspinals with MHP prior to exercise Pt in prone with pillow under abd, pre mod B thoracic paraspinal with ice                        Manual Therapy: 20 minutes  10 minutes   10 minutes    Soft tissue mobilization (STM) thoracic paraspinals; mid trap/rhomoboids/lower trap Pt in prone with pillow under abdomen  Focus of R mid thoracic paraspinals and lower trap; pt in prone   Same; with focus of R mid thoracic trigger point    Epley Maneuver  To th L                   Aquatic Exercise: next 60 min  1.5# 60 min 1.5# 60 min  2.5#      Gait F/B/S/M  x4L x4L with UE movements x4L UE movement      Heel/Toe walk          4-way          PF/DF          Hamstring Curls          Hip Flexion with knee ext.   (rockette)          Squats            SLR march          Lunges          Hip circles          Hip horizontal abduction/adduction          Vestibular Re-Ed: eyes fixed  x4L With Rotation R/L x2L  Then up/down with head  x2L same      Vestibular re-ed: eyes following shoulder movement  x4L x4L  x4L      Squats with UE movmemnt  x25 x25 flex/ext shlds x25 flex/ext shoulders      Saddle seating with 2 noodles: UE movement  x4L x2L F  x2L B x1L F/  x1L B      Shoulder glides keeping head and hips still  x20 x20       Pelvic tilts on noodle  x20 x20 x20      Core: tray walking push/pull with eyes fixed on target    x4L x4L x4L      UE row while in mini-squat   Paddles open  x15 Paddles open 2x15      UE horizontal abd/add in mini-squat   Paddles open  x15 Paddles x20      Core: UE shoulder abd/add    x15 open paddles      Core: partial tandem with UE flex/ext alternating     2x15 paddles open      Deep well bike  5 min 5 min 5 min      Deep well jumping geri  2 min 2 min 2 min      Deep well scissors  2 min 2 min 2 min      Deep well:  traction  6 min 5 min 3 min                Hamstring Stretch          Step Lunge          Piriformis stretch          PF Stretch          Balance: Single leg Stance          Balance: Tandem Therapeutic Exercise: 15 minutes    60 min 35 minutes 45 min   Mid thoracic stretch 3 H 15    3H15     Doorway pec stretch 3 H 15    3H15 3 H 20    scapt-retract x10         Transverse abdominous stabilization Pt in supine   10 H 5-10    Pt in supine   10 H 5-10 10 H 10 in supine 10 H 10 in supine   bridging x10    x10 x15 H3-5 x15H5   Single knee to chest     x10BLE x10 BLE x10 BLE   Cat/camel     x10H5 x10 H5 x15H10   Seated thoracic lumbar extension     x10  x10   Thoracic mobilization on foam roll     x10     Seated thoracic flexion and rotation with arms crossed     x7H10     Rows seated with red theraband     x15 Red 2x10 Red 2x10   Quadruped thoracic rotation with full range with hand on neck     x5BUE     Quadruped TA stab       With alternating flex/ext UEs  2x10 With alternating flex/ext UEs  2x10   Seated shoulder c external rotation      Red band x 15 Red band 2x10  Red 2x10   Scaption with theraband     Red band x 15BUE 2# 2x10 BUE 2# 2x10 BUE   Lower trunk rotations      x15 x15   Prayer stretch       2 H 20 with sidebend to R/L 2 H 20 with sidebending to R/L   Shoulder extension with extended elbow and focus of TA stabs during exercise      B with green t-band  2x10 B with green t-band  2x10   F=forward  B=Backward  S=sidesteps  M=marches    H=hold        Manual: (see flow sheet above for minutes) see above to decrease muscular tightness and release trigger points (held;may alternate sessions with manual or prn)  Modalities: (see flow sheet above for minutes) see above to decrease pain and muscular spasm; pt monitored and skin WNL (held due to no c/o pain)    HEP: Pt was instructed with above HEP during initial evaluation and issued handout. Treatment/Session Assessment:  Pt reports significant improvements in overall pain and movement. D/C pt today due to goals met and no pain. Pt is able to perform all ADL's and activities at prior functional level.  Pt was able to demonstrated full HEP with little cuing       · Pain/ Symptoms: Initial:   0/10 mid thoracic constant ache that worsens with activity     \"I feel sl much better. Post Session:  0/10 mid thoracic   \"I do not have any pain.  ·   Compliance with Program/Exercises: compliant  Recommendations/Intent for next treatment session: D/C to HEP with goals met  Total Treatment Duration:  PT Patient Time In/Time Out  Time In: 1330  Time Out: 1415     Aby Nicole, PTA   Janet Rosario, PT

## 2018-12-27 ENCOUNTER — APPOINTMENT (OUTPATIENT)
Dept: PHYSICAL THERAPY | Age: 63
End: 2018-12-27
Payer: COMMERCIAL

## 2019-01-04 ENCOUNTER — APPOINTMENT (OUTPATIENT)
Dept: PHYSICAL THERAPY | Age: 64
End: 2019-01-04

## 2019-01-08 ENCOUNTER — APPOINTMENT (OUTPATIENT)
Dept: PHYSICAL THERAPY | Age: 64
End: 2019-01-08

## 2019-01-10 ENCOUNTER — APPOINTMENT (OUTPATIENT)
Dept: PHYSICAL THERAPY | Age: 64
End: 2019-01-10

## 2019-01-15 ENCOUNTER — APPOINTMENT (OUTPATIENT)
Dept: PHYSICAL THERAPY | Age: 64
End: 2019-01-15

## 2019-01-17 ENCOUNTER — HOSPITAL ENCOUNTER (OUTPATIENT)
Dept: PHYSICAL THERAPY | Age: 64
End: 2019-01-17

## 2019-01-22 ENCOUNTER — APPOINTMENT (OUTPATIENT)
Dept: PHYSICAL THERAPY | Age: 64
End: 2019-01-22

## 2019-01-24 ENCOUNTER — APPOINTMENT (OUTPATIENT)
Dept: PHYSICAL THERAPY | Age: 64
End: 2019-01-24

## 2019-01-29 ENCOUNTER — APPOINTMENT (OUTPATIENT)
Dept: PHYSICAL THERAPY | Age: 64
End: 2019-01-29

## 2019-01-31 ENCOUNTER — APPOINTMENT (OUTPATIENT)
Dept: PHYSICAL THERAPY | Age: 64
End: 2019-01-31

## 2019-10-25 ENCOUNTER — APPOINTMENT (RX ONLY)
Dept: URBAN - METROPOLITAN AREA CLINIC 24 | Facility: CLINIC | Age: 64
Setting detail: DERMATOLOGY
End: 2019-10-25

## 2019-10-25 DIAGNOSIS — Z71.89 OTHER SPECIFIED COUNSELING: ICD-10-CM

## 2019-10-25 DIAGNOSIS — L82.1 OTHER SEBORRHEIC KERATOSIS: ICD-10-CM

## 2019-10-25 DIAGNOSIS — D18.0 HEMANGIOMA: ICD-10-CM

## 2019-10-25 DIAGNOSIS — D22 MELANOCYTIC NEVI: ICD-10-CM

## 2019-10-25 DIAGNOSIS — L72.0 EPIDERMAL CYST: ICD-10-CM

## 2019-10-25 PROBLEM — D22.5 MELANOCYTIC NEVI OF TRUNK: Status: ACTIVE | Noted: 2019-10-25

## 2019-10-25 PROBLEM — D18.01 HEMANGIOMA OF SKIN AND SUBCUTANEOUS TISSUE: Status: ACTIVE | Noted: 2019-10-25

## 2019-10-25 PROCEDURE — 99214 OFFICE O/P EST MOD 30 MIN: CPT

## 2019-10-25 PROCEDURE — ? COUNSELING

## 2019-10-25 ASSESSMENT — LOCATION SIMPLE DESCRIPTION DERM
LOCATION SIMPLE: LEFT THIGH
LOCATION SIMPLE: RIGHT EAR
LOCATION SIMPLE: RIGHT POSTERIOR THIGH
LOCATION SIMPLE: LOWER BACK
LOCATION SIMPLE: RIGHT LOWER BACK

## 2019-10-25 ASSESSMENT — LOCATION DETAILED DESCRIPTION DERM
LOCATION DETAILED: RIGHT SUPERIOR MEDIAL MIDBACK
LOCATION DETAILED: SUPERIOR LUMBAR SPINE
LOCATION DETAILED: RIGHT DISTAL POSTERIOR THIGH
LOCATION DETAILED: LEFT ANTERIOR PROXIMAL THIGH
LOCATION DETAILED: RIGHT POSTAURICULAR CREASE

## 2019-10-25 ASSESSMENT — LOCATION ZONE DERM
LOCATION ZONE: EAR
LOCATION ZONE: LEG
LOCATION ZONE: TRUNK

## 2020-11-03 ENCOUNTER — APPOINTMENT (RX ONLY)
Dept: URBAN - METROPOLITAN AREA CLINIC 24 | Facility: CLINIC | Age: 65
Setting detail: DERMATOLOGY
End: 2020-11-03

## 2020-11-03 DIAGNOSIS — L57.0 ACTINIC KERATOSIS: ICD-10-CM

## 2020-11-03 DIAGNOSIS — D18.0 HEMANGIOMA: ICD-10-CM

## 2020-11-03 DIAGNOSIS — L82.1 OTHER SEBORRHEIC KERATOSIS: ICD-10-CM

## 2020-11-03 DIAGNOSIS — L30.9 DERMATITIS, UNSPECIFIED: ICD-10-CM

## 2020-11-03 PROBLEM — D18.01 HEMANGIOMA OF SKIN AND SUBCUTANEOUS TISSUE: Status: ACTIVE | Noted: 2020-11-03

## 2020-11-03 PROCEDURE — ? LIQUID NITROGEN

## 2020-11-03 PROCEDURE — 17003 DESTRUCT PREMALG LES 2-14: CPT

## 2020-11-03 PROCEDURE — ? PRESCRIPTION

## 2020-11-03 PROCEDURE — 99214 OFFICE O/P EST MOD 30 MIN: CPT | Mod: 25

## 2020-11-03 PROCEDURE — 17000 DESTRUCT PREMALG LESION: CPT

## 2020-11-03 PROCEDURE — ? COUNSELING

## 2020-11-03 RX ORDER — TRIAMCINOLONE ACETONIDE 1 MG/G
CREAM TOPICAL
Qty: 1 | Refills: 0 | Status: ERX | COMMUNITY
Start: 2020-11-03

## 2020-11-03 RX ADMIN — TRIAMCINOLONE ACETONIDE: 1 CREAM TOPICAL at 00:00

## 2020-11-03 ASSESSMENT — LOCATION DETAILED DESCRIPTION DERM
LOCATION DETAILED: LEFT ANTERIOR PROXIMAL THIGH
LOCATION DETAILED: LEFT INFERIOR MEDIAL MIDBACK
LOCATION DETAILED: RIGHT VENTRAL DISTAL FOREARM
LOCATION DETAILED: RIGHT RADIAL DORSAL HAND
LOCATION DETAILED: PERIUMBILICAL SKIN
LOCATION DETAILED: LEFT PROXIMAL PRETIBIAL REGION
LOCATION DETAILED: RIGHT DISTAL POSTERIOR THIGH

## 2020-11-03 ASSESSMENT — LOCATION ZONE DERM
LOCATION ZONE: TRUNK
LOCATION ZONE: HAND
LOCATION ZONE: LEG
LOCATION ZONE: ARM

## 2020-11-03 ASSESSMENT — LOCATION SIMPLE DESCRIPTION DERM
LOCATION SIMPLE: RIGHT HAND
LOCATION SIMPLE: ABDOMEN
LOCATION SIMPLE: LEFT LOWER BACK
LOCATION SIMPLE: RIGHT POSTERIOR THIGH
LOCATION SIMPLE: LEFT PRETIBIAL REGION
LOCATION SIMPLE: LEFT THIGH
LOCATION SIMPLE: RIGHT FOREARM

## 2020-11-03 NOTE — PROCEDURE: LIQUID NITROGEN
Consent: The patient's consent was obtained including but not limited to risks of crusting, scabbing, blistering, scarring, darker or lighter pigmentary change, recurrence, incomplete removal and infection.
Detail Level: Detailed
Duration Of Freeze Thaw-Cycle (Seconds): 20
Post-Care Instructions: I reviewed with the patient in detail post-care instructions. Patient is to wear sunprotection, and avoid picking at any of the treated lesions. Pt may apply Vaseline to crusted or scabbing areas.
Render Post-Care Instructions In Note?: yes
Render Note In Bullet Format When Appropriate: No
Number Of Freeze-Thaw Cycles: 1 freeze-thaw cycle

## 2021-10-11 LAB — MAMMOGRAPHY, EXTERNAL: NORMAL

## 2021-11-16 ENCOUNTER — APPOINTMENT (RX ONLY)
Dept: URBAN - METROPOLITAN AREA CLINIC 24 | Facility: CLINIC | Age: 66
Setting detail: DERMATOLOGY
End: 2021-11-16

## 2021-11-16 DIAGNOSIS — D485 NEOPLASM OF UNCERTAIN BEHAVIOR OF SKIN: ICD-10-CM

## 2021-11-16 DIAGNOSIS — L73.8 OTHER SPECIFIED FOLLICULAR DISORDERS: ICD-10-CM

## 2021-11-16 DIAGNOSIS — D22 MELANOCYTIC NEVI: ICD-10-CM

## 2021-11-16 DIAGNOSIS — D18.0 HEMANGIOMA: ICD-10-CM

## 2021-11-16 DIAGNOSIS — L81.4 OTHER MELANIN HYPERPIGMENTATION: ICD-10-CM

## 2021-11-16 DIAGNOSIS — L82.1 OTHER SEBORRHEIC KERATOSIS: ICD-10-CM

## 2021-11-16 DIAGNOSIS — L82.0 INFLAMED SEBORRHEIC KERATOSIS: ICD-10-CM

## 2021-11-16 PROBLEM — F41.9 ANXIETY DISORDER, UNSPECIFIED: Status: ACTIVE | Noted: 2021-11-16

## 2021-11-16 PROBLEM — D18.01 HEMANGIOMA OF SKIN AND SUBCUTANEOUS TISSUE: Status: ACTIVE | Noted: 2021-11-16

## 2021-11-16 PROBLEM — D22.5 MELANOCYTIC NEVI OF TRUNK: Status: ACTIVE | Noted: 2021-11-16

## 2021-11-16 PROBLEM — L55.1 SUNBURN OF SECOND DEGREE: Status: ACTIVE | Noted: 2021-11-16

## 2021-11-16 PROBLEM — D48.5 NEOPLASM OF UNCERTAIN BEHAVIOR OF SKIN: Status: ACTIVE | Noted: 2021-11-16

## 2021-11-16 PROCEDURE — 99213 OFFICE O/P EST LOW 20 MIN: CPT | Mod: 25

## 2021-11-16 PROCEDURE — 11102 TANGNTL BX SKIN SINGLE LES: CPT | Mod: 59

## 2021-11-16 PROCEDURE — ? LIQUID NITROGEN

## 2021-11-16 PROCEDURE — ? COUNSELING

## 2021-11-16 PROCEDURE — ? BIOPSY BY SHAVE METHOD

## 2021-11-16 PROCEDURE — 17110 DESTRUCTION B9 LES UP TO 14: CPT

## 2021-11-16 ASSESSMENT — LOCATION DETAILED DESCRIPTION DERM
LOCATION DETAILED: PERIUMBILICAL SKIN
LOCATION DETAILED: LEFT MEDIAL UPPER BACK
LOCATION DETAILED: LEFT ANTERIOR PROXIMAL THIGH
LOCATION DETAILED: LEFT POSTERIOR SHOULDER
LOCATION DETAILED: LEFT INFERIOR LATERAL MIDBACK
LOCATION DETAILED: NASAL SUPRATIP
LOCATION DETAILED: LEFT INFERIOR MEDIAL MIDBACK
LOCATION DETAILED: RIGHT DISTAL POSTERIOR THIGH
LOCATION DETAILED: RIGHT SUPERIOR LATERAL UPPER BACK
LOCATION DETAILED: LEFT INFERIOR UPPER BACK

## 2021-11-16 ASSESSMENT — LOCATION ZONE DERM
LOCATION ZONE: ARM
LOCATION ZONE: TRUNK
LOCATION ZONE: LEG
LOCATION ZONE: NOSE

## 2021-11-16 ASSESSMENT — LOCATION SIMPLE DESCRIPTION DERM
LOCATION SIMPLE: LEFT THIGH
LOCATION SIMPLE: LEFT UPPER BACK
LOCATION SIMPLE: LEFT SHOULDER
LOCATION SIMPLE: LEFT LOWER BACK
LOCATION SIMPLE: NOSE
LOCATION SIMPLE: RIGHT BACK
LOCATION SIMPLE: ABDOMEN
LOCATION SIMPLE: RIGHT POSTERIOR THIGH

## 2021-11-16 NOTE — PROCEDURE: LIQUID NITROGEN
Render Note In Bullet Format When Appropriate: No
Medical Necessity Information: It is in your best interest to select a reason for this procedure from the list below. All of these items fulfill various CMS LCD requirements except the new and changing color options.
Show Topical Anesthesia Variable?: Yes
Medical Necessity Clause: This procedure was medically necessary because the lesions that were treated were:
Detail Level: Detailed
Post-Care Instructions: I reviewed with the patient in detail post-care instructions. Patient is to wear sunprotection, and avoid picking at any of the treated lesions. Pt may apply Vaseline to crusted or scabbing areas.
Consent: The patient's consent was obtained including but not limited to risks of crusting, scabbing, blistering, scarring, darker or lighter pigmentary change, recurrence, incomplete removal and infection.

## 2021-11-16 NOTE — PROCEDURE: BIOPSY BY SHAVE METHOD
Hide Topical Anesthesia?: No
Consent: Written consent was obtained and risks were reviewed including but not limited to scarring, infection, bleeding, scabbing, incomplete removal, nerve damage and allergy to anesthesia.
Cryotherapy Text: The wound bed was treated with cryotherapy after the biopsy was performed.
Anesthesia Volume In Cc: 0.5
Dressing: bandage
Silver Nitrate Text: The wound bed was treated with silver nitrate after the biopsy was performed.
Biopsy Method: Dermablade
Was A Bandage Applied: Yes
Anesthesia Type: 1% lidocaine with epinephrine
Path Notes (To The Dermatopathologist): .
Post-Care Instructions: I reviewed with the patient in detail post-care instructions. Patient is to keep the biopsy site dry overnight, and then apply bacitracin twice daily until healed. Patient may apply hydrogen peroxide soaks to remove any crusting.
Wound Care: Vaseline
Notification Instructions: Patient will be notified of biopsy results. However, patient instructed to call the office if not contacted within 2 weeks.
Curettage Text: The wound bed was treated with curettage after the biopsy was performed.
Additional Anesthesia Volume In Cc (Will Not Render If 0): 0
Biopsy Type: H and E
Depth Of Biopsy: dermis
Billing Type: Third-Party Bill
Electrodesiccation And Curettage Text: The wound bed was treated with electrodesiccation and curettage after the biopsy was performed.
Type Of Destruction Used: Curettage
Information: Selecting Yes will display possible errors in your note based on the variables you have selected. This validation is only offered as a suggestion for you. PLEASE NOTE THAT THE VALIDATION TEXT WILL BE REMOVED WHEN YOU FINALIZE YOUR NOTE. IF YOU WANT TO FAX A PRELIMINARY NOTE YOU WILL NEED TO TOGGLE THIS TO 'NO' IF YOU DO NOT WANT IT IN YOUR FAXED NOTE.
Hemostasis: Aluminum Chloride
Electrodesiccation Text: The wound bed was treated with electrodesiccation after the biopsy was performed.
Detail Level: Detailed

## 2022-06-30 ENCOUNTER — TELEPHONE (OUTPATIENT)
Dept: ORTHOPEDIC SURGERY | Age: 67
End: 2022-06-30

## 2022-07-13 ENCOUNTER — OFFICE VISIT (OUTPATIENT)
Dept: ORTHOPEDIC SURGERY | Age: 67
End: 2022-07-13
Payer: MEDICARE

## 2022-07-13 DIAGNOSIS — M50.30 DDD (DEGENERATIVE DISC DISEASE), CERVICAL: ICD-10-CM

## 2022-07-13 DIAGNOSIS — G95.9 CERVICAL MYELOPATHY WITH CERVICAL RADICULOPATHY (HCC): ICD-10-CM

## 2022-07-13 DIAGNOSIS — M51.34 DDD (DEGENERATIVE DISC DISEASE), THORACIC: ICD-10-CM

## 2022-07-13 DIAGNOSIS — M51.24 PROTRUSION OF THORACIC INTERVERTEBRAL DISC: ICD-10-CM

## 2022-07-13 DIAGNOSIS — M48.02 CERVICAL SPINAL STENOSIS: Primary | ICD-10-CM

## 2022-07-13 DIAGNOSIS — M54.12 CERVICAL MYELOPATHY WITH CERVICAL RADICULOPATHY (HCC): ICD-10-CM

## 2022-07-13 PROCEDURE — 99214 OFFICE O/P EST MOD 30 MIN: CPT | Performed by: PHYSICIAN ASSISTANT

## 2022-07-13 PROCEDURE — G8417 CALC BMI ABV UP PARAM F/U: HCPCS | Performed by: PHYSICIAN ASSISTANT

## 2022-07-13 PROCEDURE — 1090F PRES/ABSN URINE INCON ASSESS: CPT | Performed by: PHYSICIAN ASSISTANT

## 2022-07-13 PROCEDURE — 1036F TOBACCO NON-USER: CPT | Performed by: PHYSICIAN ASSISTANT

## 2022-07-13 PROCEDURE — G8400 PT W/DXA NO RESULTS DOC: HCPCS | Performed by: PHYSICIAN ASSISTANT

## 2022-07-13 PROCEDURE — G8427 DOCREV CUR MEDS BY ELIG CLIN: HCPCS | Performed by: PHYSICIAN ASSISTANT

## 2022-07-13 PROCEDURE — 1123F ACP DISCUSS/DSCN MKR DOCD: CPT | Performed by: PHYSICIAN ASSISTANT

## 2022-07-13 PROCEDURE — 3017F COLORECTAL CA SCREEN DOC REV: CPT | Performed by: PHYSICIAN ASSISTANT

## 2022-07-13 RX ORDER — GABAPENTIN 100 MG/1
100 CAPSULE ORAL 2 TIMES DAILY
Qty: 60 CAPSULE | Refills: 0 | Status: SHIPPED | OUTPATIENT
Start: 2022-07-13 | End: 2022-08-12

## 2022-07-13 RX ORDER — BUSPIRONE HYDROCHLORIDE 7.5 MG/1
TABLET ORAL
COMMUNITY
Start: 2022-04-09

## 2022-07-13 RX ORDER — METHIMAZOLE 5 MG/1
TABLET ORAL
COMMUNITY
Start: 2022-05-19

## 2022-07-13 RX ORDER — GABAPENTIN 300 MG/1
300 CAPSULE ORAL 2 TIMES DAILY
COMMUNITY
Start: 2021-09-02

## 2022-07-13 NOTE — PROGRESS NOTES
Name: Melvi Jacinto  YOB: 1955  Gender: female  MRN: [id number]    CC: Back Pain (neck recheck)        History of present illness: This is a very pleasant 73 y.o. old female who presented with a 5-year history of thoracic back pain radiating into the parascapular region. She is also to chronic neck pain. Pain usually radiates to the left side of the thoracic spine radiates up to the neck and into the left shoulder. Also has pain in the right thoracic spine to more around the shoulder blade. Symptoms did exacerbate more recently after a fall. She had an MRI scan of the thoracic spine in 2013 showing a T7-8 right-sided thoracic disc protrusion. She is not had any imaging of the cervical spine. There was no treatment for this thoracic disc protrusion other than physical therapy at that time. Pain is worse after standing for long period of time. She does not get radiation into the lower extremities. She does occasionally feel weakness in the upper extremities and has dropped objects. We ordered a MRI scan of both the cervical and thoracic spine. MRI scan of the cervical spine revealed severe spinal stenosis at C5-6. We felt this was the source of her pain. She had  2 cervical epidural steroid injections with  at [facility] for advanced management of pain. She experienced over 80% relief of her symptoms with the first injection however her second injection was in April 6 and she did not perceive very much relief without injection. She then began physical therapy in May twice a week. She has been on Advil and tizanidine as well as gabapentin. Pain is still persisting and she is interested in surgical intervention.      AMB PAIN ASSESSMENT 7/13/2022   Severity of Pain 6       Allergies   Allergen Reactions    Naproxen Rash, Swelling and Anaphylaxis     Swelling of tongue  Able to tolerate motrin      Sulfa Antibiotics Hives     Other reaction(s): Hives/Swelling-Allergy      Current Outpatient Medications   Medication Sig Dispense Refill    busPIRone (BUSPAR) 7.5 MG tablet TAKE 1 TABLET BY MOUTH TWICE A DAY      gabapentin (NEURONTIN) 300 MG capsule Take 300 mg by mouth 2 times daily.  methIMAzole (TAPAZOLE) 5 MG tablet TAKE 1 TABLET BY MOUTH EVERY DAY      gabapentin (NEURONTIN) 100 MG capsule Take 1 capsule by mouth 2 times daily for 30 days. Take 1 tablet by mouth at night for 3 nights, then increase to BID morning and night for 3 days, you may taper up to three times a day. 60 capsule 0    acyclovir (ZOVIRAX) 5 % ointment Apply topically      atorvastatin (LIPITOR) 10 MG tablet Take 10 mg by mouth daily      cetirizine (ZYRTEC) 10 MG tablet Take 10 mg by mouth daily      Cholecalciferol 50 MCG (2000 UT) TABS Take 2 tablets by mouth daily      ibuprofen (ADVIL;MOTRIN) 600 MG tablet Take 600 mg by mouth every 6 hours as needed      pantoprazole (PROTONIX) 40 MG tablet Take 40 mg by mouth daily      Pramoxine-HC (HYDROCORTISONE-PRAMOXINE) 2.5-1 % CREA cream Place rectally 3 times daily      SUMAtriptan (IMITREX) 100 MG tablet Take 100 mg by mouth once as needed      tiZANidine (ZANAFLEX) 4 MG tablet Take 4 mg by mouth 2 times daily as needed      topiramate (TOPAMAX) 25 MG tablet Take 25 mg by mouth daily (with breakfast)       No current facility-administered medications for this visit.      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/Left breast Lumpectomy with 1 node    CHOLECYSTECTOMY  2004    COLONOSCOPY  08/22/2016    HYSTERECTOMY (CERVIX STATUS UNKNOWN)      ORTHOPEDIC SURGERY      hand surgery    TONSILLECTOMY        Patient Active Problem List    Diagnosis Date Noted    Primary osteoarthritis involving multiple joints 09/22/2015    Migraine 09/22/2015    Mixed hyperlipidemia 06/09/2015    Degeneration, intervertebral disc, thoracic 06/09/2015    [facility]    GERD (gastroesophageal reflux disease) 06/09/2015         Tobacco Use: Low Risk     Smoking Tobacco Use: Never Smoker    Smokeless Tobacco Use: Never Used         Alcohol Use:     Frequency of Alcohol Consumption: Not on file    Average Number of Drinks: Not on file    Frequency of Binge Drinking: Not on file       Physical Exam:     GENERAL:  Adult in no acute distress, well developed, well nourished . Patient is appropriately conversant  CERVICAL SPINE:  Inspection of the neck reveals no evidence of rash or skin lesion. Examination of the cervical spine reveals no palpable tenderness or step deformity and decreased ROM  Spurling's sign is positive to the left side for reproduction of radicular symptoms. Patient ambulates with a normal gait. Patient is is able to toe walk and heel walk. Sensory testing reveals intact sensation to light touch in the distribution of the C5-T1 dermatomes bilaterally  Reflexes     Right Left   Biceps (C5) 2 2   Brachio radialis (C6) 2 2    Triceps (C7) 2 2     Sharp's is negative     Inverted radial reflex is negative      Tinel's and Jesica testing over the cubital and carpal tunnels do not reproduce the symptoms. Shoulder examination is not consistent with adhesive capsulitis or acute rotator cuff tendinitis. Strength testing in the upper extremity reveals the following based on the 5 point grading scale:     Delt(C5) Bicep(C6) WE(C6) Tricep (C7) WF(C7) (C8) Int (T1)   Right 5 5 5 5 5 5 4   Left 5 5 5 5 5 5 4     Pulses are palpable over bilateral radial arteries. Radiographic Studies:     AP and Lateral views of the Cervical and thoracic spine: 12/1/21  AP of the cervical spine shows mild listing to the right. There is facet arthropathy. Sagittal view of the cervical spine reveals multilevel degenerative disc changes. There is anterior listhesis C4-5.   Significant loss of disc space height at C5-6.    AP lateral of the thoracic spine reveals a levoscoliosis. There is some mild kyphosis noted as well. No acute fracture is noted. Multilevel degenerative disc changes of the thoracic spine. X-ray impression: Degenerative changes cervical spine with anterior listhesis C4-5 and degenerative disc disease C5-6. Thoracic spine with scoliosis, no acute fracture noted. MRI Results (most recent):  Results from Appointment encounter on 12/07/21    MRI CERV SPINE WO CONT    Narrative  History: neck pain radiating into bilateral shoulders, left greater than right  with left arm tingling, one-year duration. No surgery. History of breast cancer. Exam: MRI Cervical Spine without contrast    Technique: Sagittal T1, T2, STIR, axial T2 and gradient echo sequences are  available for review. Comparison: No comparison    Findings:    Degenerative disc signal present C2-3 through C6-7 with multilevel disc  osteophyte complex formation. There is mild retrolisthesis C5 on C6 and mild  anterolisthesis C4 on C5. There is normal signal in the cervical spinal cord  throughout. Limited evaluation of the posterior fossa, clivus, and  craniocervical junction is normal.      C2-3: No spinal stenosis or neural foraminal narrowing. C3-4: There is a shallow disc osteophyte complex with bilateral facet  arthropathy. There is mild spinal stenosis and moderate left neural foraminal  narrowing. There is mild right neural foraminal compromise. C4-5: There is a shallow disc osteophyte complex with mild facet arthropathy. No  spinal stenosis or neural foraminal narrowing. C5-6: There is a broad-based disc osteophyte complex with bilateral facet  arthropathy. There is severe spinal stenosis and severe bilateral neural  foraminal narrowing. C6-7: There is a broad-based disc osteophyte complex with bilateral facet  arthropathy. There is mild spinal stenosis. There is mild left neural foraminal  narrowing.     C7-T1: There is speech recognition software. As such, errors of speech recognition may be present.

## 2022-07-13 NOTE — PATIENT INSTRUCTIONS
Anterior Cervical Discectomy and Fusion    How is this procedure done? This procedure is done through a cosmetic incision in the front of the neck and involves removing the cushion between the vertebrae along with any bone spurs that are pinching the spinal cord or nerves. The space where the disc was removed is then filled with a spacer cage and bone graft. This spacer will prevent the vertebrae from collapsing and rubbing together. A plate and screws is then used to secure the cage in place. How can this help me? This procedure is intended to relieve the pressure from your spinal cord and nerves which is typically the source of the pain and numbness you may feel in your neck or arms. What are the success rates? This procedure is typically 95% effective in alleviating your neck and arm symptoms within 3 months of your surgery. However, the longer your symptoms existed prior to the surgery, the longer it may take for them to resolve after the operation. In rare circumstances patients with severe weakness and numbness may not have much improvement of their symptoms. In this case, the surgery is done to prevent further deterioration. What are the risks? The most common risk is failure of the bone graft to heal (<5%). Other less common risks include infection, blood loss, nerve injury, spinal cord injury, throat or wind pipe injury, reaction to anesthesia, leakage of spinal fluid, failure of plate/screws, failure of surgery to relieve your symptoms, persistent pain in the neck, blood clots, and very rarely, death. Also about 10-20% of patients will develop disc degeneration over the following 10 years at levels above or below the operation which may require extension of the fusion. How long will I be hospitalized? Most patients having a 1 or 2 level ACDF are sent home the same day.  However, if you have a drain placed for bleeding, are having difficulty breathing or unstable blood pressure, you may need to stay overnight. What do I expect after the operation? You will have a sore throat, some hoarseness, and swallowing discomfort for the first week. What restrictions will I have? For the first 2-4 weeks you should:   Avoid driving   Limit car rides      Avoid lifting more than 10 lbs   Avoid reaching over your head   Avoid submersing the wounds in water    Will I need therapy? You should walk as much as you can for exercise. Usually formal physical therapy or rehabilitation is not needed after this procedure. Remember to navigate stairs carefully. How do I care for my wound? Three days after the operation you may remove the outer bandages from the neck wound. The wound is sealed with dermabond glue. If the wound is dry, it is ok to take a shower. If the wound is still draining, cover it with clean dressings until there is no further drainage. Do I need a neck brace? Many neck surgeries will not require a neck brace. Often, the internal plate and screws are sufficient for stabilization. Other times, your surgeon may prescribe a neck brace for you to supplement the internal fixation or simply have you wear one for comfort. [facility]    MD Kiki Edwards MD Amy [place], PA-C Theodor Khan, NP    Main Office  [facility],[address], [facility]       For Appointments at either location, please call [phone]      Cervical Stenosis    What is cervical stenosis? Cervical stenosis is a condition in which the spinal canal in the neck is too small for the spinal cord and exiting spinal nerves. The narrow canal results in pinching of these important structures, which can eventually lead to a variety of symptoms. Spinal stenosis can develop after a single event, such as a fall or other injury.   But, more often it develops over a period of months or years due to progressive degenerative and arthritic changes in the spine. Though the patient may not be aware of its development, spinal stenosis may become rapidly apparent once the dimensions of the spinal canal become critically small. What causes cervical stenosis? Again, cervical stenosis may be the result of an acute injury such as a fracture or a disc herniation. However, in most cases cervical stenosis develops as a result of a combination of disc degeneration, bone spurs, thickened ligaments, and lax joints in the spine. All of these can cause a narrowing of the bony canal that holds the spinal cord. Aside from the physical pinching of the spinal cord, this narrowing also causes sluggish blood flow to the spinal cord. Some patients are genetically programmed to develop a small spinal canal diameter. These patients are predisposed to develop symptoms of cervical stenosis at an earlier age than patients who start off with a normal size spinal canal.   Other predisposing factors include conditions such as rheumatoid arthritis and osteoarthritis. Nicotine has been suggested to have a negative effect on the health of the intervertebral discs, which may play a role in early collapse and degeneration. What are the symptoms of cervical stenosis? Cervical stenosis can cause a variety of symptoms, some of which may be very similar to other unrelated conditions. Patients with cervical stenosis often report a history of episodic neck pain and or headaches. However, patients do not necessarily have to have neck pain to be experiencing symptoms of cervical stenosis. Symptoms caused by pinched spinal nerves in the neck may cause pain, numbness, or weakness anywhere in the upper extremity.   Therefore, cervical stenosis should be considered as a possibility in patients who appear to have conditions such as carpal tunnel syndrome or rotator cuff tears, though these conditions may exist together. If the stenosis has reached a point where it begins to press not only on the nerves but also on the spinal cord itself, additional symptoms may be experienced. This is called myelopathy. The symptoms of myelopathy may include changes in handwriting or loss of fine motor skills in the hands. An example of this would be difficulty buttoning shirts or using zippers. Cervical stenosis may also cause difficulties with balance and walking without significant leg pain. This is typically perceived as a worsening clumsiness by the patient and their family. What is the natural history? Unfortunately, the symptoms of cervical stenosis typically parallel the severity of the degeneration in the spine, which is slowly progressive. In some cases of severe stenosis, symptoms may progress to an unrecoverable level after an incidental injury or fall. What are the treatment options? Patients with mild cervical stenosis may be successfully treated without an operation. Treatments may include physical therapy with cervical traction, anti-inflammatory medications, steroid injections, chiropractic care, massage, or short-term use of a soft collar. Patients with persistent disabling symptoms or persistent upper extremity weakness, despite a trial of conservative treatments may benefit from a surgery. Surgical options include a discectomy and fusion with spacers and screws from the front of the neck, or a laminectomy (removing the bony covering over the spinal cord) and fusion from the back of the neck. The surgical option chosen depends on the overall alignment of the spine and the number of vertebral levels causing the stenosis. Patients experiencing symptoms of myelopathy may benefit form early surgical intervention. Unfortunately, the symptoms of myelopathy may be irreversible despite surgical decompression of the spinal cord.    Surgery for myelopathy is therefore considered successful if it only preserves the patient's current functional status, and prevents further progression of their symptoms.       [facility]    MD Lacey Herrera MD Amy [place], PA-C Velna Noel, NP       Main Office  [place] [address], [address] [place] [address] S 11[facility]       For Appointments at either location, please call [phone]

## 2022-07-13 NOTE — LETTER
Senora Pro                                                     SHANON REDDING  1955  MRN [id number]                                              ROOM NUMBER______      Radiographic Studies:    Cervical MRI      Thoracic MRI         Lumbar MRI          Pelvis MRI        CONTRAST    CT Myelogram: _______________   NCS/EMG ________________ ( UE  /  LE )     MRI of ___________________          Other: ____________________      Injections:    KNEE    HIP  Depomedrol _____ mg Euflexxa _____    _______________ TFESI/SNRB  _______________ SI Joint  _______________ VICENTA    _______________ Facet  _______________Piriformis/ Sciatica      Medications:    Oral Steroids _______________  NSAIDS _______________    Muscle Relaxers _______________  Neurontin/Lyrica _______________    Pain Medicine _______________  Other _______________                       Physical Therapy:    Lumbar     Thoracic      Cervical     Hip       Knee       Shoulder               Traction          Ultrasound          Dry Needling      Referral:    Pain referral:  [facility]   [facility]   Other: ______________________________    Follow-up/ Refer__________________________________________________    Authorization to hold blood thinners:___________________________________

## 2022-09-08 ENCOUNTER — OFFICE VISIT (OUTPATIENT)
Dept: ORTHOPEDIC SURGERY | Age: 67
End: 2022-09-08
Payer: MEDICARE

## 2022-09-08 DIAGNOSIS — M54.12 CERVICAL RADICULOPATHY: Primary | ICD-10-CM

## 2022-09-08 DIAGNOSIS — M48.02 CERVICAL SPINAL STENOSIS: ICD-10-CM

## 2022-09-08 PROCEDURE — G8417 CALC BMI ABV UP PARAM F/U: HCPCS | Performed by: ORTHOPAEDIC SURGERY

## 2022-09-08 PROCEDURE — 99214 OFFICE O/P EST MOD 30 MIN: CPT | Performed by: ORTHOPAEDIC SURGERY

## 2022-09-08 PROCEDURE — G8400 PT W/DXA NO RESULTS DOC: HCPCS | Performed by: ORTHOPAEDIC SURGERY

## 2022-09-08 PROCEDURE — 1123F ACP DISCUSS/DSCN MKR DOCD: CPT | Performed by: ORTHOPAEDIC SURGERY

## 2022-09-08 PROCEDURE — 1090F PRES/ABSN URINE INCON ASSESS: CPT | Performed by: ORTHOPAEDIC SURGERY

## 2022-09-08 PROCEDURE — 3017F COLORECTAL CA SCREEN DOC REV: CPT | Performed by: ORTHOPAEDIC SURGERY

## 2022-09-08 PROCEDURE — G8427 DOCREV CUR MEDS BY ELIG CLIN: HCPCS | Performed by: ORTHOPAEDIC SURGERY

## 2022-09-08 PROCEDURE — 1036F TOBACCO NON-USER: CPT | Performed by: ORTHOPAEDIC SURGERY

## 2022-09-08 NOTE — PROGRESS NOTES
Name: Lio Solo  YOB: 1955  Gender: female  MRN: [id number]  Age: 78 y.o. Chief Complaint: Mid thoracic pain. History of present illness: This is a very pleasant 78 y.o. female who presents with a many year midthoracic pain. In the work-up, she had MRIs of the cervical and thoracic spine. The primary pathology was found in the cervical spine where there was stenosis. She also had advanced spondylotic changes throughout the thoracic spine and a disc herniation causing some right-sided cord compression. The patient does not perceive any weakness in the upper extremities. She does have some numbness in her fingertips. But otherwise, she does not perceive any change in dexterity. No gait changes. Her pain is across the mid back and it is significant rated 8/10 on the visual analog scale. .         Medications:     Prior to Visit Medications    Medication Sig Taking? Authorizing Provider   busPIRone (BUSPAR) 7.5 MG tablet TAKE 1 TABLET BY MOUTH TWICE A DAY  Historical Provider, MD   gabapentin (NEURONTIN) 300 MG capsule Take 300 mg by mouth 2 times daily. Historical Provider, MD   methIMAzole (TAPAZOLE) 5 MG tablet TAKE 1 TABLET BY MOUTH EVERY DAY  Historical Provider, MD   gabapentin (NEURONTIN) 100 MG capsule Take 1 capsule by mouth 2 times daily for 30 days. Take 1 tablet by mouth at night for 3 nights, then increase to BID morning and night for 3 days, you may taper up to three times a day.   Marita Maldonado PA-C   acyclovir (ZOVIRAX) 5 % ointment Apply topically  Ar Automatic Reconciliation   atorvastatin (LIPITOR) 10 MG tablet Take 10 mg by mouth daily  Ar Automatic Reconciliation   cetirizine (ZYRTEC) 10 MG tablet Take 10 mg by mouth daily  Ar Automatic Reconciliation   Cholecalciferol 50 MCG (2000 UT) TABS Take 2 tablets by mouth daily  Ar Automatic Reconciliation   ibuprofen (ADVIL;MOTRIN) 600 MG tablet Take 600 mg by mouth every 6 hours as needed  Ar Automatic Reconciliation   pantoprazole (PROTONIX) 40 MG tablet Take 40 mg by mouth daily  Ar Automatic Reconciliation   Pramoxine-HC (HYDROCORTISONE-PRAMOXINE) 2.5-1 % CREA cream Place rectally 3 times daily  Ar Automatic Reconciliation   SUMAtriptan (IMITREX) 100 MG tablet Take 100 mg by mouth once as needed  Ar Automatic Reconciliation   tiZANidine (ZANAFLEX) 4 MG tablet Take 4 mg by mouth 2 times daily as needed  Ar Automatic Reconciliation   topiramate (TOPAMAX) 25 MG tablet Take 25 mg by mouth daily (with breakfast)  Ar Automatic Reconciliation       Allergies: Allergies   Allergen Reactions    Naproxen Rash, Swelling and Anaphylaxis     Swelling of tongue  Able to tolerate motrin      Sulfa Antibiotics Hives     Other reaction(s): Hives/Swelling-Allergy        Physical Exam:     This is a well developed well nourished adult female in no acute distress. Oriented to person, place and time. Mood and affect are appropriate. Respirations are unlabored and there is no evidence of cyanosis. There is not significant tenderness to palpation along the spinous processes or paraspinal musculature. Patient ambulates with a normal tandem gait. Sensory testing reveals intact sensation to light touch and in the distribution of the C5-T1 dermatomes bilaterally, except for decreased sensation over the fingertips diffusely    Reflexes     Right Left   Biceps (C5) 2 2   Brachio radialis (C6) 2 2    Triceps (C7) 2 2       Sharp's is negative        Tinel's and Jesica testing over the cubital and carpal tunnels do not reproduce the symptoms. Shoulder examination is not consistent with adhesive capsulitis or acute rotator cuff tendinitis.     Strength testing in the upper extremity reveals the following based on the 5 point grading scale:     Delt(C5) Bicep(C6) WE(C6) Tricep (C7) WF(C7) (C8) Int (T1)   Right 5 5 5 5 5 5 5   Left 5 5 5 5 5 5 5       Radiographic Studies:  MRI Cervical spine, report and images reviewed and interpreted and reveals cervical stenosis C5-C6 with cord compression but no myelomalacia. Course and size of the vertebral arteries: Normal    Thoracic MRI images also reviewed and reveal advanced multilevel disc degeneration with large areas of Modic changes. She does have a disc herniation at T7-T8 with right-sided cord compression. This has been present since an MRI back in 2013 and has not changed      Diagnosis:      ICD-10-CM    1. Cervical radiculopathy  M54.12       2. Cervical spinal stenosis  M48.02           Assessment/Plan: This patient's clinical history and physical exam is consistent with spondylotic thoracic pain which correlates most with the Modic changes and degenerative disks seen throughout the thoracic spine. She has an incidental finding of cervical stenosis but her neurologic exam is very reassuring. There is no spasticity. There is no intrinsic weakness. There is no dexterity loss. There is no gait change. In this scenario, I think her cervical stenosis is asymptomatic and would not recommend a surgery for it. We discussed the signs and symptoms of myelopathy and she will watch for them closely. If she should develop any of them she will follow-up promptly at which point I would recommend a C5-C6 ACDF. In terms of her thoracic back pain, I do not think there is a lot we can do surgically. She is going to need to continue conservative efforts including medications, therapy, bracing, massage, pain management, etc.   She will follow-up with me as needed.            Electronically Signed By Chana Stubbs MD   3:06 PM

## 2022-11-29 ENCOUNTER — APPOINTMENT (RX ONLY)
Dept: URBAN - METROPOLITAN AREA CLINIC 24 | Facility: CLINIC | Age: 67
Setting detail: DERMATOLOGY
End: 2022-11-29

## 2022-11-29 DIAGNOSIS — D18.0 HEMANGIOMA: ICD-10-CM

## 2022-11-29 DIAGNOSIS — L82.1 OTHER SEBORRHEIC KERATOSIS: ICD-10-CM

## 2022-11-29 DIAGNOSIS — L81.4 OTHER MELANIN HYPERPIGMENTATION: ICD-10-CM

## 2022-11-29 DIAGNOSIS — D22 MELANOCYTIC NEVI: ICD-10-CM

## 2022-11-29 DIAGNOSIS — L73.8 OTHER SPECIFIED FOLLICULAR DISORDERS: ICD-10-CM

## 2022-11-29 PROBLEM — D22.5 MELANOCYTIC NEVI OF TRUNK: Status: ACTIVE | Noted: 2022-11-29

## 2022-11-29 PROBLEM — D18.01 HEMANGIOMA OF SKIN AND SUBCUTANEOUS TISSUE: Status: ACTIVE | Noted: 2022-11-29

## 2022-11-29 PROCEDURE — ? COUNSELING

## 2022-11-29 PROCEDURE — 99213 OFFICE O/P EST LOW 20 MIN: CPT

## 2022-11-29 ASSESSMENT — LOCATION SIMPLE DESCRIPTION DERM
LOCATION SIMPLE: LEFT LOWER BACK
LOCATION SIMPLE: RIGHT POSTERIOR THIGH
LOCATION SIMPLE: NOSE
LOCATION SIMPLE: LEFT SHOULDER
LOCATION SIMPLE: ABDOMEN
LOCATION SIMPLE: RIGHT BACK
LOCATION SIMPLE: LEFT UPPER BACK
LOCATION SIMPLE: LEFT THIGH

## 2022-11-29 ASSESSMENT — LOCATION ZONE DERM
LOCATION ZONE: LEG
LOCATION ZONE: NOSE
LOCATION ZONE: TRUNK
LOCATION ZONE: ARM

## 2022-11-29 ASSESSMENT — LOCATION DETAILED DESCRIPTION DERM
LOCATION DETAILED: RIGHT DISTAL POSTERIOR THIGH
LOCATION DETAILED: NASAL SUPRATIP
LOCATION DETAILED: LEFT INFERIOR UPPER BACK
LOCATION DETAILED: LEFT POSTERIOR SHOULDER
LOCATION DETAILED: LEFT INFERIOR MEDIAL MIDBACK
LOCATION DETAILED: LEFT ANTERIOR PROXIMAL THIGH
LOCATION DETAILED: PERIUMBILICAL SKIN
LOCATION DETAILED: LEFT INFERIOR LATERAL MIDBACK
LOCATION DETAILED: RIGHT SUPERIOR LATERAL UPPER BACK

## 2024-02-29 ENCOUNTER — APPOINTMENT (RX ONLY)
Dept: URBAN - METROPOLITAN AREA CLINIC 25 | Facility: CLINIC | Age: 69
Setting detail: DERMATOLOGY
End: 2024-02-29

## 2024-02-29 DIAGNOSIS — L82.1 OTHER SEBORRHEIC KERATOSIS: ICD-10-CM

## 2024-02-29 DIAGNOSIS — D22 MELANOCYTIC NEVI: ICD-10-CM

## 2024-02-29 DIAGNOSIS — L72.0 EPIDERMAL CYST: ICD-10-CM

## 2024-02-29 DIAGNOSIS — L81.4 OTHER MELANIN HYPERPIGMENTATION: ICD-10-CM

## 2024-02-29 DIAGNOSIS — D18.0 HEMANGIOMA: ICD-10-CM

## 2024-02-29 DIAGNOSIS — L57.8 OTHER SKIN CHANGES DUE TO CHRONIC EXPOSURE TO NONIONIZING RADIATION: ICD-10-CM

## 2024-02-29 DIAGNOSIS — L73.8 OTHER SPECIFIED FOLLICULAR DISORDERS: ICD-10-CM

## 2024-02-29 PROBLEM — D22.5 MELANOCYTIC NEVI OF TRUNK: Status: ACTIVE | Noted: 2024-02-29

## 2024-02-29 PROBLEM — D18.01 HEMANGIOMA OF SKIN AND SUBCUTANEOUS TISSUE: Status: ACTIVE | Noted: 2024-02-29

## 2024-02-29 PROCEDURE — ? PRESCRIPTION

## 2024-02-29 PROCEDURE — 99213 OFFICE O/P EST LOW 20 MIN: CPT

## 2024-02-29 PROCEDURE — ? COUNSELING

## 2024-02-29 RX ORDER — TRETIONIN 0.25 MG/G
CREAM TOPICAL
Qty: 45 | Refills: 11 | Status: ERX | COMMUNITY
Start: 2024-02-29

## 2024-02-29 RX ADMIN — TRETIONIN: 0.25 CREAM TOPICAL at 00:00

## 2024-02-29 ASSESSMENT — LOCATION DETAILED DESCRIPTION DERM
LOCATION DETAILED: NASAL SUPRATIP
LOCATION DETAILED: LEFT INFERIOR LATERAL MIDBACK
LOCATION DETAILED: RIGHT DISTAL POSTERIOR THIGH
LOCATION DETAILED: RIGHT POSTERIOR SHOULDER
LOCATION DETAILED: PERIUMBILICAL SKIN
LOCATION DETAILED: EPIGASTRIC SKIN
LOCATION DETAILED: LEFT ANTERIOR PROXIMAL THIGH
LOCATION DETAILED: LEFT POSTERIOR SHOULDER
LOCATION DETAILED: RIGHT CENTRAL MALAR CHEEK
LOCATION DETAILED: RIGHT SUPERIOR LATERAL UPPER BACK
LOCATION DETAILED: LEFT INFERIOR UPPER BACK
LOCATION DETAILED: MIDDLE STERNUM

## 2024-02-29 ASSESSMENT — LOCATION SIMPLE DESCRIPTION DERM
LOCATION SIMPLE: RIGHT POSTERIOR THIGH
LOCATION SIMPLE: LEFT SHOULDER
LOCATION SIMPLE: LEFT LOWER BACK
LOCATION SIMPLE: LEFT UPPER BACK
LOCATION SIMPLE: ABDOMEN
LOCATION SIMPLE: NOSE
LOCATION SIMPLE: RIGHT CHEEK
LOCATION SIMPLE: CHEST
LOCATION SIMPLE: RIGHT BACK
LOCATION SIMPLE: LEFT THIGH
LOCATION SIMPLE: RIGHT SHOULDER

## 2024-02-29 ASSESSMENT — LOCATION ZONE DERM
LOCATION ZONE: LEG
LOCATION ZONE: FACE
LOCATION ZONE: TRUNK
LOCATION ZONE: NOSE
LOCATION ZONE: ARM

## 2024-08-13 ENCOUNTER — HOSPITAL ENCOUNTER (OUTPATIENT)
Dept: GENERAL RADIOLOGY | Age: 69
Discharge: HOME | End: 2024-08-16
Payer: MEDICARE

## 2024-08-13 DIAGNOSIS — R10.11 RUQ ABDOMINAL PAIN: ICD-10-CM

## 2024-08-13 DIAGNOSIS — R79.89 ABNORMAL LFTS: ICD-10-CM

## 2024-08-13 DIAGNOSIS — R11.0 NAUSEA: ICD-10-CM

## 2024-08-13 DIAGNOSIS — R10.9 STOMACH PAIN: ICD-10-CM

## 2024-08-13 PROCEDURE — 74018 RADEX ABDOMEN 1 VIEW: CPT

## 2025-03-04 ENCOUNTER — APPOINTMENT (OUTPATIENT)
Dept: URBAN - METROPOLITAN AREA CLINIC 25 | Facility: CLINIC | Age: 70
Setting detail: DERMATOLOGY
End: 2025-03-04

## 2025-03-04 DIAGNOSIS — L81.4 OTHER MELANIN HYPERPIGMENTATION: ICD-10-CM

## 2025-03-04 DIAGNOSIS — Z71.89 OTHER SPECIFIED COUNSELING: ICD-10-CM

## 2025-03-04 DIAGNOSIS — D18.0 HEMANGIOMA: ICD-10-CM

## 2025-03-04 DIAGNOSIS — L73.8 OTHER SPECIFIED FOLLICULAR DISORDERS: ICD-10-CM

## 2025-03-04 DIAGNOSIS — D22 MELANOCYTIC NEVI: ICD-10-CM

## 2025-03-04 DIAGNOSIS — L82.1 OTHER SEBORRHEIC KERATOSIS: ICD-10-CM

## 2025-03-04 PROBLEM — D18.01 HEMANGIOMA OF SKIN AND SUBCUTANEOUS TISSUE: Status: ACTIVE | Noted: 2025-03-04

## 2025-03-04 PROBLEM — D22.5 MELANOCYTIC NEVI OF TRUNK: Status: ACTIVE | Noted: 2025-03-04

## 2025-03-04 PROBLEM — D22.71 MELANOCYTIC NEVI OF RIGHT LOWER LIMB, INCLUDING HIP: Status: ACTIVE | Noted: 2025-03-04

## 2025-03-04 PROCEDURE — ? COUNSELING

## 2025-03-04 PROCEDURE — 99213 OFFICE O/P EST LOW 20 MIN: CPT

## 2025-03-04 ASSESSMENT — LOCATION SIMPLE DESCRIPTION DERM
LOCATION SIMPLE: RIGHT BACK
LOCATION SIMPLE: ABDOMEN
LOCATION SIMPLE: LEFT UPPER BACK
LOCATION SIMPLE: NOSE
LOCATION SIMPLE: LEFT LOWER BACK
LOCATION SIMPLE: RIGHT POSTERIOR THIGH
LOCATION SIMPLE: RIGHT HAND
LOCATION SIMPLE: LEFT SHOULDER
LOCATION SIMPLE: LEFT THIGH

## 2025-03-04 ASSESSMENT — LOCATION ZONE DERM
LOCATION ZONE: HAND
LOCATION ZONE: LEG
LOCATION ZONE: NOSE
LOCATION ZONE: TRUNK
LOCATION ZONE: ARM

## 2025-03-04 ASSESSMENT — LOCATION DETAILED DESCRIPTION DERM
LOCATION DETAILED: PERIUMBILICAL SKIN
LOCATION DETAILED: NASAL SUPRATIP
LOCATION DETAILED: EPIGASTRIC SKIN
LOCATION DETAILED: LEFT INFERIOR UPPER BACK
LOCATION DETAILED: RIGHT SUPERIOR LATERAL UPPER BACK
LOCATION DETAILED: RIGHT THENAR EMINENCE
LOCATION DETAILED: LEFT ANTERIOR PROXIMAL THIGH
LOCATION DETAILED: LEFT INFERIOR LATERAL MIDBACK
LOCATION DETAILED: LEFT POSTERIOR SHOULDER
LOCATION DETAILED: RIGHT DISTAL POSTERIOR THIGH

## 2025-05-20 ENCOUNTER — APPOINTMENT (OUTPATIENT)
Dept: CT IMAGING | Age: 70
End: 2025-05-20
Payer: MEDICARE

## 2025-05-20 ENCOUNTER — HOSPITAL ENCOUNTER (EMERGENCY)
Age: 70
Discharge: HOME | End: 2025-05-20
Attending: EMERGENCY MEDICINE
Payer: MEDICARE

## 2025-05-20 VITALS
WEIGHT: 193 LBS | OXYGEN SATURATION: 98 % | HEIGHT: 67 IN | TEMPERATURE: 98.4 F | DIASTOLIC BLOOD PRESSURE: 74 MMHG | RESPIRATION RATE: 17 BRPM | SYSTOLIC BLOOD PRESSURE: 158 MMHG | BODY MASS INDEX: 30.29 KG/M2 | HEART RATE: 80 BPM

## 2025-05-20 DIAGNOSIS — R10.9 ACUTE ABDOMINAL PAIN: Primary | ICD-10-CM

## 2025-05-20 DIAGNOSIS — K57.32 DIVERTICULITIS OF COLON: ICD-10-CM

## 2025-05-20 LAB
ALBUMIN SERPL-MCNC: 4.2 G/DL (ref 3.2–4.6)
ALBUMIN/GLOB SERPL: 1.3 (ref 1–1.9)
ALP SERPL-CCNC: 99 U/L (ref 35–104)
ALT SERPL-CCNC: 21 U/L (ref 12–65)
ANION GAP SERPL CALC-SCNC: 10 MMOL/L (ref 7–16)
APPEARANCE UR: CLEAR
AST SERPL-CCNC: 20 U/L (ref 15–37)
BASOPHILS # BLD: 0.02 K/UL (ref 0–0.2)
BASOPHILS NFR BLD: 0.2 % (ref 0–2)
BILIRUB SERPL-MCNC: 0.8 MG/DL (ref 0–1.2)
BILIRUB UR QL: NEGATIVE
BUN SERPL-MCNC: 12 MG/DL (ref 8–23)
CALCIUM SERPL-MCNC: 9.5 MG/DL (ref 8.8–10.2)
CHLORIDE SERPL-SCNC: 103 MMOL/L (ref 98–107)
CO2 SERPL-SCNC: 23 MMOL/L (ref 20–29)
COLOR UR: YELLOW
CREAT SERPL-MCNC: 0.93 MG/DL (ref 0.8–1.3)
DIFFERENTIAL METHOD BLD: ABNORMAL
EOSINOPHIL # BLD: 0.06 K/UL (ref 0–0.8)
EOSINOPHIL NFR BLD: 0.7 % (ref 0.5–7.8)
ERYTHROCYTE [DISTWIDTH] IN BLOOD BY AUTOMATED COUNT: 12.4 % (ref 11.9–14.6)
GLOBULIN SER CALC-MCNC: 3.2 G/DL (ref 2.3–3.5)
GLUCOSE SERPL-MCNC: 109 MG/DL (ref 65–100)
GLUCOSE UR STRIP.AUTO-MCNC: NEGATIVE MG/DL
HCT VFR BLD AUTO: 37.2 % (ref 35.8–46.3)
HGB BLD-MCNC: 12.6 G/DL (ref 11.7–15.4)
HGB UR QL STRIP: NEGATIVE
IMM GRANULOCYTES # BLD AUTO: 0.02 K/UL (ref 0–0.5)
IMM GRANULOCYTES NFR BLD AUTO: 0.2 % (ref 0–5)
KETONES UR QL STRIP.AUTO: NEGATIVE MG/DL
LEUKOCYTE ESTERASE UR QL STRIP.AUTO: NEGATIVE
LYMPHOCYTES # BLD: 1.55 K/UL (ref 0.5–4.6)
LYMPHOCYTES NFR BLD: 16.8 % (ref 13–44)
MCH RBC QN AUTO: 30 PG (ref 26.1–32.9)
MCHC RBC AUTO-ENTMCNC: 33.9 G/DL (ref 31.4–35)
MCV RBC AUTO: 88.6 FL (ref 82–102)
MONOCYTES # BLD: 0.96 K/UL (ref 0.1–1.3)
MONOCYTES NFR BLD: 10.4 % (ref 4–12)
NEUTS SEG # BLD: 6.62 K/UL (ref 1.7–8.2)
NEUTS SEG NFR BLD: 71.7 % (ref 43–78)
NITRITE UR QL STRIP.AUTO: NEGATIVE
NRBC # BLD: 0 K/UL (ref 0–0.2)
PH UR STRIP: 6.5 (ref 5–9)
PLATELET # BLD AUTO: 248 K/UL (ref 150–450)
PMV BLD AUTO: 9.1 FL (ref 9.4–12.3)
POTASSIUM SERPL-SCNC: 4.1 MMOL/L (ref 3.5–5.1)
PROT SERPL-MCNC: 7.4 G/DL (ref 6.3–8.2)
PROT UR STRIP-MCNC: NEGATIVE MG/DL
RBC # BLD AUTO: 4.2 M/UL (ref 4.05–5.2)
SODIUM SERPL-SCNC: 136 MMOL/L (ref 133–143)
SP GR UR REFRACTOMETRY: <=1.005 (ref 1–1.02)
UROBILINOGEN UR QL STRIP.AUTO: 0.2 EU/DL (ref 0.2–1)
WBC # BLD AUTO: 9.2 K/UL (ref 4.3–11.1)

## 2025-05-20 PROCEDURE — 74177 CT ABD & PELVIS W/CONTRAST: CPT

## 2025-05-20 PROCEDURE — 99285 EMERGENCY DEPT VISIT HI MDM: CPT

## 2025-05-20 PROCEDURE — 6360000004 HC RX CONTRAST MEDICATION: Performed by: EMERGENCY MEDICINE

## 2025-05-20 PROCEDURE — 80053 COMPREHEN METABOLIC PANEL: CPT

## 2025-05-20 PROCEDURE — 81003 URINALYSIS AUTO W/O SCOPE: CPT

## 2025-05-20 PROCEDURE — 85025 COMPLETE CBC W/AUTO DIFF WBC: CPT

## 2025-05-20 PROCEDURE — 51798 US URINE CAPACITY MEASURE: CPT

## 2025-05-20 PROCEDURE — 87086 URINE CULTURE/COLONY COUNT: CPT

## 2025-05-20 RX ORDER — IOPAMIDOL 755 MG/ML
100 INJECTION, SOLUTION INTRAVASCULAR
Status: COMPLETED | OUTPATIENT
Start: 2025-05-20 | End: 2025-05-20

## 2025-05-20 RX ADMIN — IOPAMIDOL 100 ML: 755 INJECTION, SOLUTION INTRAVENOUS at 13:29

## 2025-05-20 ASSESSMENT — PAIN SCALES - GENERAL
PAINLEVEL_OUTOF10: 2
PAINLEVEL_OUTOF10: 3

## 2025-05-20 ASSESSMENT — PAIN - FUNCTIONAL ASSESSMENT
PAIN_FUNCTIONAL_ASSESSMENT: 0-10
PAIN_FUNCTIONAL_ASSESSMENT: 0-10

## 2025-05-20 NOTE — ED PROVIDER NOTES
adenopathy.  - BONES: Degenerative changes of the spine. Dextroscoliosis.  - VASCULATURE: Mild atherosclerosis. Normal caliber abdominal aorta.  - OTHER: No ascites.      Impression    Acute uncomplicated sigmoid diverticulitis.      If providers have any questions about this report, I can be reached on  [employer].      Electronically signed by Bari Ngo   Urinalysis w rflx microscopic   Result Value Ref Range    Color, UA YELLOW      Appearance CLEAR      Specific Gravity, UA <=1.005 1.001 - 1.023    pH, Urine 6.5 5.0 - 9.0      Protein, UA Negative NEG mg/dL    Glucose, Ur Negative NEG mg/dL    Ketones, Urine Negative NEG mg/dL    Bilirubin, Urine Negative NEG      Blood, Urine Negative NEG      Urobilinogen, Urine 0.2 0.2 - 1.0 EU/dL    Nitrite, Urine Negative NEG      Leukocyte Esterase, Urine Negative NEG     CBC with Auto Differential   Result Value Ref Range    WBC 9.2 4.3 - 11.1 K/uL    RBC 4.20 4.05 - 5.20 M/uL    Hemoglobin 12.6 11.7 - 15.4 g/dL    Hematocrit 37.2 35.8 - 46.3 %    MCV 88.6 82.0 - 102.0 FL    MCH 30.0 26.1 - 32.9 PG    MCHC 33.9 31.4 - 35.0 g/dL    RDW 12.4 11.9 - 14.6 %    Platelets 248 150 - 450 K/uL    MPV 9.1 (L) 9.4 - 12.3 FL    nRBC 0.00 0.0 - 0.2 K/uL    Differential Type AUTOMATED      Neutrophils % 71.7 43.0 - 78.0 %    Lymphocytes % 16.8 13.0 - 44.0 %    Monocytes % 10.4 4.0 - 12.0 %    Eosinophils % 0.7 0.5 - 7.8 %    Basophils % 0.2 0.0 - 2.0 %    Immature Granulocytes % 0.2 0.0 - 5.0 %    Neutrophils Absolute 6.62 1.70 - 8.20 K/UL    Lymphocytes Absolute 1.55 0.50 - 4.60 K/UL    Monocytes Absolute 0.96 0.10 - 1.30 K/UL    Eosinophils Absolute 0.06 0.00 - 0.80 K/UL    Basophils Absolute 0.02 0.00 - 0.20 K/UL    Immature Granulocytes Absolute 0.02 0.0 - 0.5 K/UL   Comprehensive Metabolic Panel   Result Value Ref Range    Sodium 136 133 - 143 mmol/L    Potassium 4.1 3.5 - 5.1 mmol/L    Chloride 103 98 - 107 mmol/L    CO2 23 20 - 29 mmol/L    Anion Gap 10 7 - 16 mmol/L

## 2025-05-20 NOTE — DISCHARGE INSTRUCTIONS
Tylenol if needed for pain.  Take antibiotic until complete.  Low residue diet.  Call your doctor to schedule follow-up appointment.  Advised to recheck if symptoms or not improving in about 3 days.  Recheck sooner for worse pain, high fever or other concerns including vomiting

## 2025-05-20 NOTE — ED TRIAGE NOTES
Pt presents to ED with urinary symptoms. Pt went to urgent care this morning and had no UTI but was told to come to the ED for a scan of her bladder. +Lower abd pain, urinary frequency. Md at bedside in triage.

## 2025-05-22 LAB
BACTERIA SPEC CULT: NORMAL
SERVICE CMNT-IMP: NORMAL

## 2025-05-31 ENCOUNTER — APPOINTMENT (OUTPATIENT)
Dept: GENERAL RADIOLOGY | Age: 70
End: 2025-05-31
Payer: MEDICARE

## 2025-05-31 ENCOUNTER — HOSPITAL ENCOUNTER (EMERGENCY)
Age: 70
Discharge: HOME | End: 2025-05-31
Payer: MEDICARE

## 2025-05-31 VITALS
HEIGHT: 67 IN | SYSTOLIC BLOOD PRESSURE: 135 MMHG | TEMPERATURE: 97.8 F | HEART RATE: 56 BPM | DIASTOLIC BLOOD PRESSURE: 71 MMHG | WEIGHT: 190 LBS | RESPIRATION RATE: 18 BRPM | OXYGEN SATURATION: 100 % | BODY MASS INDEX: 29.82 KG/M2

## 2025-05-31 DIAGNOSIS — S90.30XA CONTUSION OF DORSUM OF FOOT: Primary | ICD-10-CM

## 2025-05-31 PROCEDURE — 73630 X-RAY EXAM OF FOOT: CPT

## 2025-05-31 PROCEDURE — 99283 EMERGENCY DEPT VISIT LOW MDM: CPT

## 2025-05-31 RX ORDER — IBUPROFEN 600 MG/1
600 TABLET, FILM COATED ORAL 4 TIMES DAILY PRN
Qty: 40 TABLET | Refills: 0 | Status: SHIPPED | OUTPATIENT
Start: 2025-05-31

## 2025-05-31 ASSESSMENT — PAIN DESCRIPTION - LOCATION: LOCATION: FOOT

## 2025-05-31 ASSESSMENT — PAIN - FUNCTIONAL ASSESSMENT
PAIN_FUNCTIONAL_ASSESSMENT: 0-10
PAIN_FUNCTIONAL_ASSESSMENT: 0-10

## 2025-05-31 ASSESSMENT — PAIN SCALES - GENERAL
PAINLEVEL_OUTOF10: 5
PAINLEVEL_OUTOF10: 5

## 2025-05-31 ASSESSMENT — PAIN DESCRIPTION - ORIENTATION: ORIENTATION: LEFT

## 2025-05-31 NOTE — ED TRIAGE NOTES
Pt ambulatory to triage with steady gait with spouse. Pt reports left foot redness, swelling and bruising after a large bottle of shower gel fell onto her foot on Wednesday

## 2025-05-31 NOTE — ED PROVIDER NOTES
Emergency Department Provider Note       [facility] EMERGENCY DEPT   PCP: Kristan Kinney MD   Age: 70 y.o.   Sex: female     DISPOSITION Decision To Discharge 05/31/2025 03:57:32 PM    ICD-10-CM    1. Contusion of dorsum of foot  S90.30XA           Medical Decision Making     70-year-old white female with a past medical history of intercostal neuritis, recent BX, diverticular disease, diverticulitis, GERD, HSV, hyperlipidemia, migraines, breast CA, presents emergency room with a chief complaint of pain and discomfort to the dorsum of the left foot and at the base of the toes.  She is noticed swelling, ecchymosis, and some mild redness after dropping a large bottle shower gel onto her foot this past Wednesday.    Patient seen and evaluated in the emergency department.  Will obtain plain films of the foot.  Patient declined pain medication at this time.  Working diagnosis of contusion of the foot, left foot fracture, hematoma left foot.    Patient's workup is back.  X-ray showed no acute osseous injury    Patient was placed in Ace wrap, will have her limit all weightbearing.  Continue ice, rest, compression, elevation.  Will prescribe ibuprofen as needed for pain.  However follow-up with your PCP this week for recheck exam reviewed all red flag signs symptoms of necessitate a return to the emergency room she verbalized understanding.     1 acute, uncomplicated illness or injury.  Prescription drug management performed.  Shared medical decision making was utilized in creating the patients health plan today.  I independently ordered and reviewed each unique test.                         History     70-year-old white female with a past medical history of intercostal neuritis, recent BX, diverticular disease, diverticulitis, GERD, HSV, hyperlipidemia, migraines, breast CA, presents emergency room with a chief complaint of pain and discomfort to the dorsum of the left foot and at the base of the toes.  She is noticed

## 2025-05-31 NOTE — DISCHARGE INSTRUCTIONS
Rest is much as possible  Ace wrap for compression  Keep the area elevated when at rest  Ice to the area 20 minutes each hour while awake for the first 48 hours and then 4-6 times daily as needed for any pain or swelling  .  Does have a small hematoma.  This can take up to 6 weeks to fully resolve.  No strenuous activity  Return to ER if any problems  Especially if you notice any redness spreading on the dorsum of the foot, any redness streaking up the foot, or you start to develop fever.